# Patient Record
Sex: FEMALE | Race: WHITE
[De-identification: names, ages, dates, MRNs, and addresses within clinical notes are randomized per-mention and may not be internally consistent; named-entity substitution may affect disease eponyms.]

---

## 2018-02-20 ENCOUNTER — HOSPITAL ENCOUNTER (INPATIENT)
Dept: HOSPITAL 25 - PMRU | Age: 78
LOS: 28 days | Discharge: NURSING, REHAB OR LONG-TERM CARE | DRG: 57 | End: 2018-03-20
Attending: PHYSICAL MEDICINE & REHABILITATION | Admitting: PHYSICAL MEDICINE & REHABILITATION
Payer: MEDICARE

## 2018-02-20 DIAGNOSIS — I69.351: Primary | ICD-10-CM

## 2018-02-20 DIAGNOSIS — F17.210: ICD-10-CM

## 2018-02-20 DIAGNOSIS — K52.9: ICD-10-CM

## 2018-02-20 DIAGNOSIS — Z88.6: ICD-10-CM

## 2018-02-20 DIAGNOSIS — L89.612: ICD-10-CM

## 2018-02-20 DIAGNOSIS — Z79.899: ICD-10-CM

## 2018-02-20 DIAGNOSIS — R73.9: ICD-10-CM

## 2018-02-20 DIAGNOSIS — B96.1: ICD-10-CM

## 2018-02-20 DIAGNOSIS — E87.6: ICD-10-CM

## 2018-02-20 DIAGNOSIS — I10: ICD-10-CM

## 2018-02-20 DIAGNOSIS — Z88.0: ICD-10-CM

## 2018-02-20 DIAGNOSIS — I69.322: ICD-10-CM

## 2018-02-20 DIAGNOSIS — I69.319: ICD-10-CM

## 2018-02-20 DIAGNOSIS — A04.72: ICD-10-CM

## 2018-02-20 DIAGNOSIS — I69.391: ICD-10-CM

## 2018-02-20 DIAGNOSIS — E83.42: ICD-10-CM

## 2018-02-20 DIAGNOSIS — Z66: ICD-10-CM

## 2018-02-20 DIAGNOSIS — N39.0: ICD-10-CM

## 2018-02-20 DIAGNOSIS — I48.0: ICD-10-CM

## 2018-02-20 DIAGNOSIS — B96.89: ICD-10-CM

## 2018-02-20 DIAGNOSIS — Z88.2: ICD-10-CM

## 2018-02-20 DIAGNOSIS — R13.10: ICD-10-CM

## 2018-02-20 DIAGNOSIS — I69.392: ICD-10-CM

## 2018-02-20 PROCEDURE — F08Z1ZZ DRESSING TECHNIQUES TREATMENT: ICD-10-PCS | Performed by: PHYSICAL MEDICINE & REHABILITATION

## 2018-02-20 PROCEDURE — F07Z9ZZ GAIT TRAINING/FUNCTIONAL AMBULATION TREATMENT: ICD-10-PCS | Performed by: PHYSICAL MEDICINE & REHABILITATION

## 2018-02-20 PROCEDURE — 83735 ASSAY OF MAGNESIUM: CPT

## 2018-02-20 PROCEDURE — 81003 URINALYSIS AUTO W/O SCOPE: CPT

## 2018-02-20 PROCEDURE — F06ZDZZ SWALLOWING DYSFUNCTION TREATMENT: ICD-10-PCS | Performed by: PHYSICAL MEDICINE & REHABILITATION

## 2018-02-20 PROCEDURE — 80048 BASIC METABOLIC PNL TOTAL CA: CPT

## 2018-02-20 PROCEDURE — F08Z3ZZ FEEDING/EATING TREATMENT: ICD-10-PCS | Performed by: PHYSICAL MEDICINE & REHABILITATION

## 2018-02-20 PROCEDURE — 87086 URINE CULTURE/COLONY COUNT: CPT

## 2018-02-20 PROCEDURE — 80053 COMPREHEN METABOLIC PANEL: CPT

## 2018-02-20 PROCEDURE — F06Z6ZZ COMMUNICATIVE/COGNITIVE INTEGRATION SKILLS TREATMENT: ICD-10-PCS | Performed by: PHYSICAL MEDICINE & REHABILITATION

## 2018-02-20 PROCEDURE — 81015 MICROSCOPIC EXAM OF URINE: CPT

## 2018-02-20 PROCEDURE — 83036 HEMOGLOBIN GLYCOSYLATED A1C: CPT

## 2018-02-20 PROCEDURE — 74230 X-RAY XM SWLNG FUNCJ C+: CPT

## 2018-02-20 PROCEDURE — F07Z8ZZ TRANSFER TRAINING TREATMENT: ICD-10-PCS | Performed by: PHYSICAL MEDICINE & REHABILITATION

## 2018-02-20 PROCEDURE — 36415 COLL VENOUS BLD VENIPUNCTURE: CPT

## 2018-02-20 PROCEDURE — 87186 SC STD MICRODIL/AGAR DIL: CPT

## 2018-02-20 PROCEDURE — F08Z0ZZ BATHING/SHOWERING TECHNIQUES TREATMENT: ICD-10-PCS | Performed by: PHYSICAL MEDICINE & REHABILITATION

## 2018-02-20 PROCEDURE — 87077 CULTURE AEROBIC IDENTIFY: CPT

## 2018-02-20 PROCEDURE — F07Z5ZZ BED MOBILITY TREATMENT: ICD-10-PCS | Performed by: PHYSICAL MEDICINE & REHABILITATION

## 2018-02-20 PROCEDURE — 85025 COMPLETE CBC W/AUTO DIFF WBC: CPT

## 2018-02-20 PROCEDURE — 71046 X-RAY EXAM CHEST 2 VIEWS: CPT

## 2018-02-20 RX ADMIN — DABIGATRAN ETEXILATE MESYLATE SCH MG: 150 CAPSULE ORAL at 20:59

## 2018-02-20 RX ADMIN — VANCOMYCIN HYDROCHLORIDE SCH MG: 125 CAPSULE ORAL at 20:58

## 2018-02-20 RX ADMIN — VANCOMYCIN HYDROCHLORIDE SCH MG: 125 CAPSULE ORAL at 16:10

## 2018-02-20 RX ADMIN — DOCUSATE SODIUM SCH: 100 CAPSULE, LIQUID FILLED ORAL at 20:56

## 2018-02-20 NOTE — HP
HISTORY AND PHYSICAL:

 

DATE OF ADMISSION:  02/20/18

 

REASON FOR ADMISSION:  CVA with right hemiplegia.

 

HISTORY OF PRESENT ILLNESS:  Tigist Suarez is a 77-year-old female.  She has a 
history significant for hypertension.  She lives in her own home in [place].  
On Saturday, 02/10/18, the patient was in her own home.  She called and spoke 
to her daughter.  Her daughter then called on 02/11/18 the following day and 
was not able to reach her mother.  She went to her mother's house and found her 
mother on the floor.  She was on the floor for many hours.  Unfortunately, she 
was outside the window for TPA.  Her daughter called 911 and she was brought to 
[facility].  She had imaging consistent with total occlusion of the 
left internal carotid with left basal ganglia and peripheral left middle 
cerebral artery territory.  Surgery was consulted; but due to the 100% occlusion
, no intervention was recommended.  She had a dense right hemiplegia as well as 
a left gaze preference.  She also was noted to have left-sided facial droop and 
significant dysarthria and dysphagia.  She was on a purred diet with nectar 
thick liquids.  She was felt to have Physical Therapy, Occupational Therapy, 
and Speech Therapy needs. She is now being admitted for inpatient rehab so that 
she might return to independent living.  Also of note, the patient did have 
newly documented atrial fibrillation with rapid ventricular response on 02/12/
18.  Recommendations of the cardiologist was that she be anticoagulated 1 week 
after the stroke onset.  She was started on Pradaxa on 02/17/18.  She also had 
a beta-blocker added.  She also had diarrhea while at [facility].  
C. diff was positive on 02/17/18.  She was started on Flagyl and then switched 
to vancomycin.  The vancomycin should finish 02/28/18.

 

PAST MEDICAL HISTORY:  Significant for the aforementioned hypertension.  In 
addition, the patient may have had a TIA over the summer, which she did not 
tell anybody about until after she had her stroke.  She developed the acute 
onset left facial droop for several hours which passed on its own.

 

CURRENT MEDICATIONS:  Include:

1.  Pradaxa.

2.  Cozaar.

3.  Lipitor.

4.  Toprol-XL.

5.  Vancomycin for C. diff.

 

ALLERGIES:  To ASPIRIN, BENADRYL, PENICILLIN, and SULFA.

 

SOCIAL HISTORY:  She is a smoker, nondrinker.  She lives by herself in a 1-
story house with several steps to enter her.  Her daughter lives next door.

 

REVIEW OF SYSTEMS:  The patient reports no shortness of breath or chest pain.

 

                               PHYSICAL EXAMINATION

 

VITAL SIGNS:  The patient's temperature is 98.2, blood pressure is 158/92, 
pulse 58, respirations 16.

 

HEENT:  She has a left gaze preference.  With effort, she is able to look past 
the midline to the right, but typically looks towards the left.  She has a left 
facial droop.

 

LUNGS:  Sound clear to auscultation bilaterally.

 

HEART:  Regular.  S1, S2 audible.

 

ABDOMEN:  Soft and nontender.

 

EXTREMITIES:  Her right upper and lower extremity appeared to be flaccid. 
Peripheral pulses were intact.He right heel has a 3 x 3 cm Stage II (at least) 
pressure ulcer, with outer epidermal layer open

 

NEUROLOGIC:  The patient was awake, alert, oriented.  Muscle strength on the 
right, I did not detect any active movement in her right upper and lower 
extremity. Sensation was slightly diminished.  As mentioned, she has a gaze 
preference to the left.  She has a significant dysarthria.

 

FUNCTIONAL EXAM:  She transfers with mod assist of 2 people.

 

 ASSESSMENT:  Cerebrovascular accident with right hemiplegia.

 

PLAN:  Integrate her into a comprehensive and therapeutic rehab program with 
the following goals.

 

1.  Physical Therapy will work with the patient.  They are going to work on 
functional transfer training, ambulation training, and wheelchair mobilities.

2.  Occupational Therapy will see the patient and work on her activities of 
daily living including toileting and toilet transfers.

3.  Speech Therapy will see the patient.  They are going to work on her 
significant dysarthria as well as her swallowing difficulties.

4.  We are going to continue Pradaxa for her atrial fibrillation along with 
Toprol- XL.

5.  DVT prophylaxis is Pradaxa 150 mg twice a day.

6.  For her dysarthria, she remains on a pureed diet with nectar thick liquids.

7.  For secondary stroke prevention, we are going to continue Pradaxa as well 
as Lipitor.

8.  Smoking cessation will be encouraged.

9.  SSRIs including Prozac as indicated.  We are going to start her on Prozac 
10 mg in the next few days.

10.  Family training as appropriate.

11.   will be closely involved to make sure that any services 
and equipment that the patient requires are in place prior to discharge.

12.  Home with appropriate services.

 

ESTIMATED LENGTH OF STAY:  Four to five weeks.

 

[id number]/[facility] #: [id number]

HARJEET

## 2018-02-21 LAB
BASOPHILS # BLD AUTO: 0.1 10^3/UL (ref 0–0.2)
EOSINOPHIL # BLD AUTO: 0.1 10^3/UL (ref 0–0.6)
HCT VFR BLD AUTO: 40 % (ref 35–47)
HGB BLD-MCNC: 13.4 G/DL (ref 12–16)
LYMPHOCYTES # BLD AUTO: 2.2 10^3/UL (ref 1–4.8)
MCH RBC QN AUTO: 29 PG (ref 27–31)
MCHC RBC AUTO-ENTMCNC: 33 G/DL (ref 31–36)
MCV RBC AUTO: 86 FL (ref 80–97)
MONOCYTES # BLD AUTO: 1.5 10^3/UL (ref 0–0.8)
NEUTROPHILS # BLD AUTO: 8.9 10^3/UL (ref 1.5–7.7)
NRBC # BLD AUTO: 0 10^3/UL
NRBC BLD QL AUTO: 0
PLATELET # BLD AUTO: 331 10^3/UL (ref 150–450)
RBC # BLD AUTO: 4.68 10^6/UL (ref 4–5.4)
WBC # BLD AUTO: 12.9 10^3/UL (ref 3.5–10.8)

## 2018-02-21 RX ADMIN — DOCUSATE SODIUM SCH: 100 CAPSULE, LIQUID FILLED ORAL at 09:00

## 2018-02-21 RX ADMIN — VANCOMYCIN HYDROCHLORIDE SCH MG: 125 CAPSULE ORAL at 13:32

## 2018-02-21 RX ADMIN — DABIGATRAN ETEXILATE MESYLATE SCH MG: 150 CAPSULE ORAL at 21:16

## 2018-02-21 RX ADMIN — ATORVASTATIN CALCIUM SCH MG: 80 TABLET, FILM COATED ORAL at 16:57

## 2018-02-21 RX ADMIN — VANCOMYCIN HYDROCHLORIDE SCH MG: 125 CAPSULE ORAL at 08:51

## 2018-02-21 RX ADMIN — DABIGATRAN ETEXILATE MESYLATE SCH MG: 150 CAPSULE ORAL at 08:51

## 2018-02-21 RX ADMIN — VANCOMYCIN HYDROCHLORIDE SCH MG: 125 CAPSULE ORAL at 21:16

## 2018-02-21 RX ADMIN — Medication SCH CAP: at 08:51

## 2018-02-21 RX ADMIN — LOSARTAN POTASSIUM SCH MG: 25 TABLET, FILM COATED ORAL at 08:51

## 2018-02-21 RX ADMIN — METOPROLOL SUCCINATE SCH MG: 50 TABLET, EXTENDED RELEASE ORAL at 08:51

## 2018-02-21 RX ADMIN — VANCOMYCIN HYDROCHLORIDE SCH MG: 125 CAPSULE ORAL at 16:57

## 2018-02-21 RX ADMIN — DOCUSATE SODIUM SCH: 100 CAPSULE, LIQUID FILLED ORAL at 21:12

## 2018-02-21 NOTE — PN
Progress Note


Date of Service: 02/21/18


Note: 


NUNU PRYOR was visited. Therapy notes read and reviewed. She remains with 

flaccid right side. She is also dysarthric, but communicates enough to be 

understood. Remains on pureed textures, nectar thick liquids








Current Medications: 


 Active Medications











Generic Name Dose Route Start Last Admin





  Trade Name Freq  PRN Reason Stop Dose Admin


 


Acetaminophen  650 mg  02/20/18 13:28  





  Tylenol Tab*  PO   





  Q6H PRN   





  FEVER/PAIN   


 


Atorvastatin Calcium  80 mg  02/21/18 17:00  02/21/18 16:57





  Lipitor*  PO   80 mg





  1700 IGOR   Administration


 


Dabigatran  150 mg  02/20/18 21:00  02/21/18 08:51





  Pradaxa Cap(Nf)  PO   150 mg





  BID IGOR   Administration


 


Docusate Sodium  100 mg  02/20/18 21:00  02/21/18 09:00





  Colace Cap*  PO   Not Given





  BID IGOR   


 


Lactobacillus Rhamnosus  1 cap  02/21/18 09:00  02/21/18 08:51





  Culturelle*  PO   1 cap





  DAILY IGOR   Administration


 


Losartan Potassium  100 mg  02/21/18 09:00  02/21/18 08:51





  Cozaar Tab*  PO   100 mg





  DAILY IGOR   Administration


 


Magnesium Hydroxide  30 ml  02/20/18 13:28  





  Milk Of Magnesia Liq*  PO   





  Q6H PRN   





  CONSTIPATION   


 


Metoprolol Succinate  75 mg  02/21/18 09:00  02/21/18 08:51





  Toprol Xl Tab*  PO   75 mg





  DAILY IGOR   Administration


 


Senna  2 tab  02/20/18 13:28  





  Senokot Tab*  PO   





  BEDTIME PRN   





  CONSTIPATION   


 


Vancomycin HCl  125 mg  02/20/18 17:00  02/21/18 16:57





  Vancomycin Cap*  PO  02/28/18 23:55  125 mg





  QID IGOR   Administration











Vital Signs: 


 Vital Signs











Temp Pulse Resp BP Pulse Ox


 


 97.9 F   57   18   145/64   94 


 


 02/21/18 15:46  02/21/18 15:46  02/21/18 15:48  02/21/18 15:46  02/21/18 15:48











Lab Results: 


 Laboratory Results - last 24 hr











  02/21/18 02/21/18





  07:20 07:20


 


WBC  12.9 H 


 


RBC  4.68 


 


Hgb  13.4 


 


Hct  40 


 


MCV  86 


 


MCH  29 


 


MCHC  33 


 


RDW  15 


 


Plt Count  331 


 


MPV  9 


 


Neut % (Auto)  69.3 


 


Lymph % (Auto)  17.3 L 


 


Mono % (Auto)  11.7 H 


 


Eos % (Auto)  0.9 


 


Baso % (Auto)  0.8 


 


Absolute Neuts (auto)  8.9 H 


 


Absolute Lymphs (auto)  2.2 


 


Absolute Monos (auto)  1.5 H 


 


Absolute Eos (auto)  0.1 


 


Absolute Basos (auto)  0.1 


 


Absolute Nucleated RBC  0 


 


Nucleated RBC %  0 


 


Sodium   138


 


Potassium   3.5


 


Chloride   105


 


Carbon Dioxide   27


 


Anion Gap   6


 


BUN   23


 


Creatinine   0.59


 


Est GFR ( Amer)   126.5


 


Est GFR (Non-Af Amer)   98.3


 


BUN/Creatinine Ratio   39.0 H


 


Glucose   101 H


 


Calcium   8.8


 


Total Bilirubin   0.60


 


AST   23


 


ALT   27


 


Alkaline Phosphatase   84


 


Total Protein   6.1 L


 


Albumin   3.1 L


 


Globulin   3.0


 


Albumin/Globulin Ratio   1.0











Exam: 





HEENT: L facial droop. Left gaze preference


LUNGS: Clear bilaterally


HEART: Reg rhythm


ABDOMEN: Soft + BS


NEUROLOGIC: Right side is flaccid. Dysarthric. 


Assessment/Plan: 





02/21/18 18:03


1. Left CVA, right hemiplegia: PT/OT/SLP. Pradaxa/Lipitor. May start Prozac


2. Dysphagia: Pureed Texture, nectar thick liquids


3. Right heel ulcer: Doing mepiplex dressings


4. P. Atrial Fibrillation: Pradaxa/Lopressor


5. C. Diff: Oral Vanco until 2/28


6. Advanced Directives: Has MOLST; DNR

## 2018-02-22 RX ADMIN — VANCOMYCIN HYDROCHLORIDE SCH MG: 125 CAPSULE ORAL at 12:51

## 2018-02-22 RX ADMIN — DABIGATRAN ETEXILATE MESYLATE SCH MG: 150 CAPSULE ORAL at 21:04

## 2018-02-22 RX ADMIN — VANCOMYCIN HYDROCHLORIDE SCH MG: 125 CAPSULE ORAL at 17:22

## 2018-02-22 RX ADMIN — VANCOMYCIN HYDROCHLORIDE SCH MG: 125 CAPSULE ORAL at 09:51

## 2018-02-22 RX ADMIN — ATORVASTATIN CALCIUM SCH MG: 80 TABLET, FILM COATED ORAL at 17:22

## 2018-02-22 RX ADMIN — METOPROLOL SUCCINATE SCH MG: 50 TABLET, EXTENDED RELEASE ORAL at 09:52

## 2018-02-22 RX ADMIN — DABIGATRAN ETEXILATE MESYLATE SCH MG: 150 CAPSULE ORAL at 09:51

## 2018-02-22 RX ADMIN — LOSARTAN POTASSIUM SCH MG: 25 TABLET, FILM COATED ORAL at 09:52

## 2018-02-22 RX ADMIN — Medication SCH CAP: at 09:52

## 2018-02-22 RX ADMIN — VANCOMYCIN HYDROCHLORIDE SCH MG: 125 CAPSULE ORAL at 21:04

## 2018-02-22 RX ADMIN — DOCUSATE SODIUM SCH: 100 CAPSULE, LIQUID FILLED ORAL at 09:11

## 2018-02-22 RX ADMIN — Medication SCH CAP: at 21:04

## 2018-02-22 RX ADMIN — NYSTATIN SCH APPLIC: 100000 POWDER TOPICAL at 22:42

## 2018-02-22 RX ADMIN — DOCUSATE SODIUM SCH: 100 CAPSULE, LIQUID FILLED ORAL at 20:53

## 2018-02-22 NOTE — PN
Progress Note


Date of Service: 02/22/18


Note: 


NUNU PRYOR was visited. Therapy notes read and reviewed. Will start Prozac 

tomorrow. We will also try the Free Water program as recommended by SLP: Allow 

sips of free water between meals after checking for any residual pocketed food. 

She has better eye movement today








Current Medications: 


 Active Medications











Generic Name Dose Route Start Last Admin





  Trade Name Freq  PRN Reason Stop Dose Admin


 


Acetaminophen  650 mg  02/20/18 13:28  





  Tylenol Tab*  PO   





  Q6H PRN   





  FEVER/PAIN   


 


Atorvastatin Calcium  80 mg  02/21/18 17:00  02/22/18 17:22





  Lipitor*  PO   80 mg





  1700 IGOR   Administration


 


Dabigatran  150 mg  02/20/18 21:00  02/22/18 09:51





  Pradaxa Cap(Nf)  PO   150 mg





  BID IGOR   Administration


 


Docusate Sodium  100 mg  02/20/18 21:00  02/22/18 09:11





  Colace Cap*  PO   Not Given





  BID IGOR   


 


Fluoxetine HCl  10 mg  02/23/18 09:00  





  Prozac Cap*  PO   





  DAILY IGOR   


 


Lactobacillus Rhamnosus  1 cap  02/22/18 21:00  





  Culturelle*  PO   





  BID IGOR   


 


Losartan Potassium  100 mg  02/21/18 09:00  02/22/18 09:52





  Cozaar Tab*  PO   100 mg





  DAILY IGOR   Administration


 


Magnesium Hydroxide  30 ml  02/20/18 13:28  





  Milk Of Magnesia Liq*  PO   





  Q6H PRN   





  CONSTIPATION   


 


Metoprolol Succinate  75 mg  02/21/18 09:00  02/22/18 09:52





  Toprol Xl Tab*  PO   75 mg





  DAILY IGOR   Administration


 


Nystatin  1 applic  02/22/18 21:00  





  Nystatin Top Powder*  TOPICAL   





  BID IGOR   


 


Senna  2 tab  02/20/18 13:28  





  Senokot Tab*  PO   





  BEDTIME PRN   





  CONSTIPATION   


 


Vancomycin HCl  125 mg  02/20/18 17:00  02/22/18 17:22





  Vancomycin Cap*  PO  02/28/18 23:55  125 mg





  QID IGOR   Administration











Vital Signs: 


 Vital Signs











Temp Pulse Resp BP Pulse Ox


 


 97.9 F   57   18   180/89   98 


 


 02/22/18 15:20  02/22/18 15:20  02/22/18 15:20  02/22/18 15:20  02/22/18 19:22











Exam: 





HEENT: L facial droop. Left gaze preference


LUNGS: Clear bilaterally


HEART: Reg rhythm


ABDOMEN: Soft + BS


NEUROLOGIC: Right side is flaccid. Dysarthric. 


Assessment/Plan: 





1. Left CVA, right hemiplegia: PT/OT/SLP. Pradaxa/Lipitor. Will start Prozac 2/ 23


2. Dysphagia: Pureed Texture, nectar thick liquids


3. Right heel ulcer: Doing mepiplex dressings


4. P. Atrial Fibrillation: Pradaxa/Lopressor


5. C. Diff: Oral Vanco until 2/28, increase Culturelle to BID


6. Advanced Directives: Has MOLST; DNR


02/22/18 20:12

## 2018-02-23 RX ADMIN — DABIGATRAN ETEXILATE MESYLATE SCH MG: 150 CAPSULE ORAL at 09:07

## 2018-02-23 RX ADMIN — VANCOMYCIN HYDROCHLORIDE SCH MG: 125 CAPSULE ORAL at 09:07

## 2018-02-23 RX ADMIN — Medication SCH CAP: at 20:54

## 2018-02-23 RX ADMIN — DABIGATRAN ETEXILATE MESYLATE SCH MG: 150 CAPSULE ORAL at 20:47

## 2018-02-23 RX ADMIN — ATORVASTATIN CALCIUM SCH MG: 80 TABLET, FILM COATED ORAL at 17:26

## 2018-02-23 RX ADMIN — VANCOMYCIN HYDROCHLORIDE SCH MG: 125 CAPSULE ORAL at 20:54

## 2018-02-23 RX ADMIN — NYSTATIN SCH APPLIC: 100000 POWDER TOPICAL at 20:54

## 2018-02-23 RX ADMIN — NYSTATIN SCH APPLIC: 100000 POWDER TOPICAL at 09:08

## 2018-02-23 RX ADMIN — FLUOXETINE SCH MG: 10 CAPSULE ORAL at 09:07

## 2018-02-23 RX ADMIN — VANCOMYCIN HYDROCHLORIDE SCH MG: 125 CAPSULE ORAL at 12:59

## 2018-02-23 RX ADMIN — VANCOMYCIN HYDROCHLORIDE SCH MG: 125 CAPSULE ORAL at 17:26

## 2018-02-23 RX ADMIN — DOCUSATE SODIUM SCH: 100 CAPSULE, LIQUID FILLED ORAL at 20:54

## 2018-02-23 RX ADMIN — Medication SCH CAP: at 09:07

## 2018-02-23 RX ADMIN — METOPROLOL SUCCINATE SCH MG: 50 TABLET, EXTENDED RELEASE ORAL at 09:07

## 2018-02-23 RX ADMIN — DOCUSATE SODIUM SCH: 100 CAPSULE, LIQUID FILLED ORAL at 09:08

## 2018-02-23 RX ADMIN — LOSARTAN POTASSIUM SCH MG: 25 TABLET, FILM COATED ORAL at 09:07

## 2018-02-23 NOTE — PN
Progress Note


Date of Service: 02/23/18


Note: 


NUNU PRYOR was visited. Nursing and therapy notes read and reviewed. No 

chest pain, shortness of breath or abdominal pain.  She does not offer any 

complaints.  She is aware she had a big stroke.








Current Medications: 


 Active Medications











Generic Name Dose Route Start Last Admin





  Trade Name Freq  PRN Reason Stop Dose Admin


 


Acetaminophen  650 mg  02/20/18 13:28  





  Tylenol Tab*  PO   





  Q6H PRN   





  FEVER/PAIN   


 


Atorvastatin Calcium  80 mg  02/21/18 17:00  02/22/18 17:22





  Lipitor*  PO   80 mg





  1700 IGOR   Administration


 


Dabigatran  150 mg  02/20/18 21:00  02/23/18 09:07





  Pradaxa Cap(Nf)  PO   150 mg





  BID IGOR   Administration


 


Docusate Sodium  100 mg  02/20/18 21:00  02/23/18 09:08





  Colace Cap*  PO   Not Given





  BID IGOR   


 


Fluoxetine HCl  10 mg  02/23/18 09:00  02/23/18 09:07





  Prozac Cap*  PO   10 mg





  DAILY IGOR   Administration


 


Lactobacillus Rhamnosus  1 cap  02/22/18 21:00  02/23/18 09:07





  Culturelle*  PO   1 cap





  BID IGOR   Administration


 


Losartan Potassium  100 mg  02/21/18 09:00  02/23/18 09:07





  Cozaar Tab*  PO   100 mg





  DAILY IGOR   Administration


 


Magnesium Hydroxide  30 ml  02/20/18 13:28  





  Milk Of Magnesia Liq*  PO   





  Q6H PRN   





  CONSTIPATION   


 


Metoprolol Succinate  75 mg  02/21/18 09:00  02/23/18 09:07





  Toprol Xl Tab*  PO   75 mg





  DAILY IGOR   Administration


 


Nystatin  1 applic  02/22/18 21:00  02/23/18 09:08





  Nystatin Top Powder*  TOPICAL   1 applic





  BID IGOR   Administration


 


Senna  2 tab  02/20/18 13:28  





  Senokot Tab*  PO   





  BEDTIME PRN   





  CONSTIPATION   


 


Vancomycin HCl  125 mg  02/20/18 17:00  02/23/18 09:07





  Vancomycin Cap*  PO  02/28/18 23:55  125 mg





  QID IGOR   Administration











Vital Signs: 


 Vital Signs











Temp Pulse Resp BP Pulse Ox


 


 98.5 F   68   18   180/72   93 


 


 02/23/18 06:10  02/23/18 06:10  02/22/18 23:50  02/23/18 06:10  02/23/18 06:10








Repeated manually by nurse at 1030 a /72 and HR 74 while sitting up in 

the chair.





Lab Results: 





Noted WBCs 12.9 on 2/21/18.





Exam: 





GEN: No acute distress. Alert and appropriate.


HEENT: L facial droop. Left gaze preference.


LUNGS: Clear bilaterally


HEART: Regular rate and rhythm


ABDOMEN: Soft, + BS, non-tender, non-distended


NEUROLOGIC: Right side is flaccid. Dysarthric. She says she can feel LT on 

right side, but does not seem reliable.


EXTREMITIES: No edema. Right lower leg is ACE wrapped with spenco on.


Assessment/Plan: 


78yo woman s/p CVA with right hemiplegia, dysphagia, dysarthria, and cognitive 

impairment.





1. Left CVA, right hemiplegia: PT/OT/SLP. Pradaxa/Lipitor. Prozac started today.


2. Dysphagia: Pureed Texture, nectar thick liquids


3. Right heel ulcer: Doing mepilex dressings.  Consider multipodus boot with 

vigilant skin checks.


4. P. Atrial Fibrillation: Pradaxa/Lopressor


5. C. Diff: Oral Vanco until 2/28, increased Culturelle to BID


6. Hypertension: It is better once she is sitting up.  I do not want to add 

another agent and make her hypotensive.  Will have HOB elevated to 30deg when 

in bed and if not sleeping, have vitals taken with her sitting up.


7. Mild leukocytosis on 2/22/18: recheck cbc in am.


8. Advanced Directives: Has MOLST; DNR


9. Estimated LOS: IP meeting today.





02/23/18 10:36

## 2018-02-23 NOTE — PMRUTEAM
PMRU: Goals


Current Status: 


 Nursing: Current Status











Skin Deviations [Right Lower   Abrasion





Leg]                           


 


Skin Deviations [Buttocks]     Rash


 


Skin Deviations [Right Heel]   Wound


 


Skin Deviations [Left Heel]    Other


 


Skin Deviation Description [   abrasion x2, scabbed over





Right Lower Leg]               


 


Skin Deviation Description [   redness. lotion applied





Buttocks]                      


 


Skin Deviation Description [   spanko boots in place





Right Heel]                    


 


Skin Deviation Description [   spanko boots in place





Left Heel]                     


 


Wound Stage [Right Heel]       II











 Physical Therapy: Current Status











Bed Mobility Assistance        Not Tested


 


Transfer Moblility Assistance  2 Max Assist


 


Transfer/Bed Mobility          None





Recommended Devices            


 


Ambulation Assistance          Unable


 


Ambulation Assistive Devices   Joey Walker














 Occupational Therapy: Current Status











Upper Body Dressing            Max Asst


 


Lower Body Dressing            Total Assist


 


Bathing                        Max Asst


 


Toileting                      Total Assist


 


Toilet Transfer                Max Asst,2 Person Assist


 


Eating                         Supervision,Min Assist














 Rec Therapy: Current Status











Summary of Assessment and      Pt. was open to conversation and identified with





Clinical Impression            interests.  Pt. states she enjoys her life and is





 "very independent".  Pt. had poor eye contact in





 conversation and at times had a difficult





 expressing herself and stated "I feel like my mind





 goes blank".  Pt. was interested in word puzzles





 which were provided to her.  Pt. was open to





 continued leisure visits.


 


Treatment Goals                Pt. will engage in recreation and leisure





 activities while on the unit.


 


Treatment Plan                 Provide RT services and encourage involvement.














 Social Work: Current Status











Discharge Plan                 return home with home care svs and family support


 


Potential for Family Training  pt's daughter is attentive and involved


 


Anticipated Discharge          Home





Destination                    


 


Discharge With                 home care svs and family support














 Speech: Current Status











Assessment                     Swallowing:  Patient demonstrated moderate





 orophayrngeal swallowing impairment ad requires





 restriction to pureed consistency, nectar thick





 liquids, and pills in pudding consistency.





 Pt is not accepting of adequate intake of nectar





 liquids, and is appropriate for free water





 protocol with use of precautions.














Goals: 


 Physical Therapy: Initial Goals











Bed Mobility Assistance        Independent


 


Transfer Mobility Assistance   Independent


 


Transfer/Bed Mobility          Joey Walker





Recommended Devices            


 


Ambulation                     Independent


 


Ambulation Recommended Devices Joey Walker


 


Ambulation Distance            50


 


Stairs Assistance              Independent


 


Stair Recommended Devices      One Rail


 


Number of Stairs               5














 Occupational Therapy: Initial Goals











Goals to be Completed in (Days 4-6 weeks





)                              


 


Upper Body Bathing Routine     Modified Independent with


 


Lower Body Bathing Routine     Modified Independent with


 


Upper Body Dressing Routine    Modified Independent with


 


Lower Body Dressing Routine    Modified Independent with


 


Toilet Hygeine and Clothing    Modified Independent with





Management Routine             


 


Toilet Transfer Routine        Modified Independent with


 


Step-In Shower Transfer        Modified Independent with





Routine                        


 


Tub Transfer Routine           Modified Independent with


 


Functional Transfers for ADL   Modified Independent with


 


Grooming Routine               Modified Independent with


 


Feeding Routine                Modified Independent with














 Nutrition: Goals











Intervention Goals             1.  pt will tolerate least-restrictive diet





 texture and liquid consistency without s/sx





 aspiration





 2.  adequate po intake to maintain lean body mass





 and hydration; no clinical s/sx dehydration





 3.  bowel pattern will be regulated; no c/o





 diarrhea (or constipation)











 Speech: Goals











Speech Goal 1                  Swallowing


 


Goal 1 Comments                Long-Term Goal: Pt will tolerate least 

restrictive





 diet consistencies with no clinical s/s or





 complications from aspiration.





 Short-Term Goal: Pt will will tolerate skilled





 trials of thin water with minimal clinical s/s





 aspiration and demonstrate understanding and





 ability to follow precautions to tolerate free





 water protocol without complications from





 aspiration.





 Status: Met.





 Revised Short-Term Goal: Pt will will tolerate





 thin water independently with minimal clinical s/s





 aspiration and demonstrate understanding and





 ability to follow precautions to tolerate free





 water protocol without complications from





 aspiration.





 SLP instructed pt to follow compensatory





 strategies of sitting upright, completing oral





 care before drinking thin water, tucking chin to





 the Left side, and taking small single sips. When





 pt did not follow precautios, pt demonstrated wet





 coughing and awareness of her mistake. SLP faded





 cueing from moderate souleymane cueing, and pt continued





 to follow precautions independently. Pt recalled





 basic precautions and required moderate cueing to





 recall all precautions.





 Nursing reported pt pocketed pills and did not





 clear pills with nectar liquids rinse, but did





 clear pills with pudding. SLP provided printed





 instructiosn and written signage for patient and





 care givers, and made recommendations to provider.





 


 


Speech Goal 2                  Problem Solving


 


Speech Goal 2 Comments         Long-Term Goal: Pt will solve functional daily





 problems independently using compensatory





 strategies as needed.





 Short-term goal:  Pt will attend to and recall





 orientation and her right-sided deficits, and





 demonstrate use of compensatory strategies to





 solve simple memory, feeding and self-care





 problems.





 Status:  Ongoing.





 SLP provided printed and written verbal puzzles.





 Pt required hand-over-hand assistance to initiate





 writing with her unaffected nondominant Left hand,





 and advanced from graded activities from simple





 strokes to letter forms woth cueing faded to





 moderate. Pt demonstated letter-finding difficulty





 in writing and verbally. For expressive cross-





 word and receptive word-search puzzle tasks, pt





 required maximal cueing to use gaze-Right strategy





 with bright visual marker, to attend to





 information on the Right side of pages.





 














 Social Work: Goals











Discharge Plan                 return home with home care svs and family support


 


Potential for Family Training  pt's daughter is attentive and involved


 


Anticipated Discharge          Home





Destination                    


 


Discharge With                 home care svs and family support

















Care Plan: 


 Care Plan





ADL's - Improve/Maintain                Start:  02/20/18 15:29              


Freq:   QSHIFT                          Status: Active      Target:         


Protocol:                                                                   








Activity Type Activity Date Activity User E-Sign Co-Sign Detail





Recorded Client Recorded Date Recorded By   


 


Document 02/23/18 11:25 [id number]   





PMRU-C09 02/23/18 11:25 [id number]   














  02/23/18





  11:25


 


PMRU Outcome: ADL's/ADL Transfers 


 


Orders/Interventions Occupational





 Therapy





 Evaluation &





 Treatment





 Communication





 Tool in Patient





 Room


 


Address Deficits Secondary To: CVA


 


Patient to receive OT 5x/wk for  Therex





min/day Self Care





 Management





 Group Therapy





 Neuromuscular





 ReEducation


 


UE/LE ADL's with Assist Yes: Iraj


 


ADL Transfers with Assist Yes: Iraj


 


Toileting: Transfers,Clothing Management Yes: Iraj





,Hygeine w/Assist 


 


Light Kitchen/Laundry w/Assist Yes: Bernarda


 


Progression Toward Outcome/Goals Progressing


 


Outcome/Goals Met Pt participated





 well in OT





 treatment





 session, pt





 with difficulty





 recalling





 previous





 sessions with





 minimal recall





 from session to





 session





 regarding





 hemidressing





 and other





 techniques. Pt





 will benefit





 from continued





 skilled OT





 intervention to





 maximize





 independence





 and safety.








Communication-Improve/Maintain          Start:  02/22/18 02:06              


Freq:   QSHIFT                          Status: Active      Target:         


Protocol:                                                                   








Activity Type Activity Date Activity User E-Sign Co-Sign Detail





Recorded Client Recorded Date Recorded By   


 


Document 02/23/18 11:58 [id number]   





SPEECH-C04 02/23/18 12:00 [id number]   














  02/23/18





  11:58


 


PMRU Outcome: Communication/Cognitive 





Status 


 


Outcome/Goals Use Comm Tools/





 Devices





 Makes Needs





 Known





 Effectively


 


Other Outcomes/Goals Long-Term Goal:





 Pt will





 tolerate least





 restrictive





 diet





 consistencies





 with no





 clinical s/s or





 complications





 from aspiration





 .





 Long-Term Goal:





 Pt will solve





 functional





 daily problems





 independently





 using





 compensatory





 strategies as





 needed.





 Short-term goal





 :  Pt will





 attend to and





 recall





 orientation and





 her right-





 sided deficits,





 and





 demonstrate use





 of





 compensatory





 strategies to





 solve simple





 memory, feeding





 and self-care





 problems.





 Short-Term Goal





 : Pt will will





 tolerate pureed





 consistency





 and nectar





 thick liquids w





 /o clinical s/s





 or





 complications





 from aspiration





 Short-Term Goal





 : Pt will will





 tolerate thin





 water





 independently





 with minimal





 clinical s/s





 aspiration and





 demonstrate





 understanding





 and ability to





 follow





 precautions to





 tolerate free





 water protocol





 without





 complications





 from aspiration





 .


 


Progression Toward Outcomes/Goals Goals Adjusted








Coping/Psych-Improve/Maintain           Start:  02/22/18 02:06              


Freq:   QSHIFT                          Status: Active      Target:         


Protocol:                                                                   








Activity Type Activity Date Activity User E-Sign Co-Sign Detail





Recorded Client Recorded Date Recorded By   


 


Document 02/23/18 00:53 [id number]   





PMRU-C03 02/23/18 00:55 [id number]   














  02/23/18





  00:53


 


PMRU Outcome: Coping/Psychosocial 


 


Coping Outcome/Goals Verbalization





 of Acceptance





 of Rehab Admit





 Willingness to





 Participate in





 Treatment Plan





 and Basic Needs


 


Psychosocial Outcome/Goals Maintain/





 Improve





 Emotional





 Health





 Cooperate/





 Participate in





 Plan


 


Progression Toward Outcome/Goals - Progressing





Coping 


 


Progression Toward Outcome/Goals - Progressing





Psychosocial 








Discharge Planning - Improve/Maintain   Start:  02/22/18 02:06              


Freq:   QSHIFT                          Status: Active      Target:         


Protocol:                                                                   








Activity Type Activity Date Activity User E-Sign Co-Sign Detail





Recorded Client Recorded Date Recorded By   


 


Document 02/23/18 00:52 [id number]   





PMRU-C03 02/23/18 00:53 [id number]   














  02/23/18





  00:52


 


PMRU Outcome: Discharge Planning 


 


Update Patient Family No


 


Outcome/Goals Demonstrates





 Understanding





 of Discharge





 Plan


 


Progression Toward Outcome/Goals Progressing








Education-Improve/Maintain              Start:  02/22/18 02:06              


Freq:   QSHIFT                          Status: Active      Target:         


Protocol:                                                                   








Activity Type Activity Date Activity User E-Sign Co-Sign Detail





Recorded Client Recorded Date Recorded By   


 


Document 02/23/18 00:53 [id number]   





PMRU-C03 02/23/18 00:55 [id number]   














  02/23/18





  00:53


 


PMRU Outcome: Education 


 


Outcome/Goals Demonstrate/





 Verbalize





 Understanding





 of Written





 Discharge





 Instructions





 Demonstrates





 Skills





 Encourage





 Questions


 


Progression Toward Outcome/Goals Progressing








/GI-Improve/Maintain                  Start:  02/22/18 02:06              


Freq:   QSHIFT                          Status: Active      Target:         


Protocol:                                                                   








Activity Type Activity Date Activity User E-Sign Co-Sign Detail





Recorded Client Recorded Date Recorded By   


 


Document 02/23/18 00:53 [id number]   





PMRU-C03 02/23/18 00:55 [id number]   














  02/23/18





  00:53


 


PMRU Outcome: Genitourinary/ 





Gastrointestinal 


 


Genitourinary- Outcome/Goals Maintain/





 Achieve Urinary





 Continence





 Remain Free of





 Hospital-





 Acquired UTI


 


Gastrointestinal-Outcome/Goals Maintain/





 Achieve Bowel





 Regularity in





 Accordance with





 Pt's Baseline





 Prevent





 Constipation


 


Progression Toward Outcome/Goals -  Progressing


 


Progression Toward Outcome/Goals - GI Not Progressing


 


Outcome/Goals Met Comment + for C,diff -





 pt still has





 bouts of





 diarrhea








Medication Administration               Start:  02/22/18 02:06              


Freq:   QSHIFT                          Status: Active      Target:         


Protocol:                                                                   








Activity Type Activity Date Activity User E-Sign Co-Sign Detail





Recorded Client Recorded Date Recorded By   


 


Document 02/23/18 00:53 [id number]   





PMRU-C03 02/23/18 00:55 [id number]   














  02/23/18





  00:53


 


PMRU Outcome: Medication Administration 


 


Assess Patient Knowledge/Teach Med No





Education for all Meds 


 


Outcome/Goals Patient





 Independent





 with Medication





 Administration





 at Home





 Demonstrates





 Understanding


 


Progression Towards Outcome/Goals Progressing


 


Is Patient Going Home on Lovenox? No








Mobility- Improve/Maintain              Start:  02/20/18 18:22              


Freq:   QSHIFT                          Status: Active      Target:         


Protocol:                                                                   








Activity Type Activity Date Activity User E-Sign Co-Sign Detail





Recorded Client Recorded Date Recorded By   


 


Document 02/22/18 12:20 [id number]   





PMRU-C08 02/22/18 12:20 [id number]   














  02/22/18





  12:20


 


PMRU Outcome: Mobility 


 


Physical Therapy Evaluation and Yes





Treatment 


 


Activity OOB with Assistance Yes


 


Device Yes


 


Assistance Yes


 


Patient to be seen 5x/wk for  min/ Therex





day for: Mobility





 Training





 Gait Training





 W/C Mobility





 Balance


 


Outcome/Goals Maintain/





 Achieve





 Baseline





 Mobility Status





 Improve





 Mobility Status





 Demonstrates





 Proper Use of





 Assistive





 Devices





 Free from





 Complications





 of Immobility


 


Progression Toward Outcome/Goals Progressing


 


Bed Mobility Yes:





 independent


 


Transfers Yes:





 independnet





 with joey





 walker


 


Gait x ft Yes:





 independent





 with joey





 walker to





 household





 distances (50')


 


Up/Down Stairs Yes:





 independent





 with 1 rail up/





 down 5








Neurological- Improve/Maintain          Start:  02/22/18 02:06              


Freq:   QSHIFT                          Status: Active      Target:         


Protocol:                                                                   








Activity Type Activity Date Activity User E-Sign Co-Sign Detail





Recorded Client Recorded Date Recorded By   


 


Document 02/23/18 00:53 [id number]   





PMRU-C03 02/23/18 00:55 [id number]   














  02/23/18





  00:53


 


PMRU Outcome: Neurological 


 


Weakness/Aphasia Weakness





 Right Side


 


Outcome/Goals Maintain/





 Achieve





 Baseline





 Neurological





 Status





 Improve





 Neurological





 Status





 Prevent





 Avoidable





 Neurological





 Decline





 Demonstrate





 Knowledge of





 Prevention/Tx





 of Neuro





 Disorders/





 Complication





 Maintain/





 Improve





 Strength/ROM


 


Progression Toward Outcome/Goals Progressing








Nutrition/Swallowing- Improve/Maintain  Start:  02/22/18 02:06              


Freq:   QSHIFT                          Status: Active      Target:         


Protocol:                                                                   








Activity Type Activity Date Activity User E-Sign Co-Sign Detail





Recorded Client Recorded Date Recorded By   


 


Document 02/23/18 00:53 [id number]   





PMRU-C03 02/23/18 00:55 [id number]   














  02/23/18





  00:53


 


PMRU Outcome: Nutrition/Swallowing 


 


Outcome/Goals Demonstrates





 Adequate





 Hydration/





 Prevents





 Dehydration


 


Progression Toward Outcome/Goals Progressing








Safety- Improve/Maintain                Start:  02/22/18 02:06              


Freq:   QSHIFT                          Status: Active      Target:         


Protocol:                                                                   








Activity Type Activity Date Activity User E-Sign Co-Sign Detail





Recorded Client Recorded Date Recorded By   


 


Document 02/23/18 00:53 [id number]   





PMRU-C03 02/23/18 00:55 [id number]   














  02/23/18





  00:53


 


PMRU Outcome: Safety 


 


Outcome/Goals Remain Free of





 Injury or Harm





 Cooperates with





 Safety





 Measures for





 Least





 Restrictive





 Environment





 Prevent Falls/





 Injury


 


Progression Toward Outcome/Goals Progressing


 


Outcome/Goals Met Comment PA in place








Skin- Improve/Maintain                  Start:  02/22/18 02:06              


Freq:   QSHIFT                          Status: Active      Target:         


Protocol:                                                                   








Activity Type Activity Date Activity User E-Sign Co-Sign Detail





Recorded Client Recorded Date Recorded By   


 


Document 02/23/18 00:53 [id number]   





PMRU-C03 02/23/18 00:55 [id number]   














  02/23/18





  00:53


 


PMRU Outcome: Skin 


 


Skin Risk Level High


 


Skin Orders Spenco Boots





 Heels Off Bed





 Turn/Position





 q2hr While in





 Bed


 


Outcome/Goals Maintain/





 Improve Skin





 Intergrity





 Maintain/





 Improve Wound





 Status


 


Progression Toward Outcome/Goals Progressing


 


Outcome/Goals Met Comment heels in spanko





 boots














Medicine Note: 








Length of Stay:  [6 weeks]





Anticipated Discharge Destination: Home





Tentative Discharge Date:  [4/6/18]





Discharged to:  [home]

## 2018-02-24 LAB
BASOPHILS # BLD AUTO: 0.1 10^3/UL (ref 0–0.2)
EOSINOPHIL # BLD AUTO: 0.1 10^3/UL (ref 0–0.6)
HCT VFR BLD AUTO: 42 % (ref 35–47)
HGB BLD-MCNC: 14.2 G/DL (ref 12–16)
LYMPHOCYTES # BLD AUTO: 2.7 10^3/UL (ref 1–4.8)
MCH RBC QN AUTO: 29 PG (ref 27–31)
MCHC RBC AUTO-ENTMCNC: 34 G/DL (ref 31–36)
MCV RBC AUTO: 86 FL (ref 80–97)
MONOCYTES # BLD AUTO: 1.4 10^3/UL (ref 0–0.8)
NEUTROPHILS # BLD AUTO: 9.3 10^3/UL (ref 1.5–7.7)
NRBC # BLD AUTO: 0 10^3/UL
NRBC BLD QL AUTO: 0
PLATELET # BLD AUTO: 331 10^3/UL (ref 150–450)
RBC # BLD AUTO: 4.86 10^6/UL (ref 4–5.4)
WBC # BLD AUTO: 13.6 10^3/UL (ref 3.5–10.8)

## 2018-02-24 RX ADMIN — VANCOMYCIN HYDROCHLORIDE SCH MG: 125 CAPSULE ORAL at 12:53

## 2018-02-24 RX ADMIN — DABIGATRAN ETEXILATE MESYLATE SCH MG: 150 CAPSULE ORAL at 08:57

## 2018-02-24 RX ADMIN — VANCOMYCIN HYDROCHLORIDE SCH MG: 125 CAPSULE ORAL at 21:02

## 2018-02-24 RX ADMIN — Medication SCH CAP: at 21:02

## 2018-02-24 RX ADMIN — FLUOXETINE SCH MG: 10 CAPSULE ORAL at 08:57

## 2018-02-24 RX ADMIN — HYDRALAZINE HYDROCHLORIDE PRN MG: 10 TABLET ORAL at 16:33

## 2018-02-24 RX ADMIN — DABIGATRAN ETEXILATE MESYLATE SCH MG: 150 CAPSULE ORAL at 21:02

## 2018-02-24 RX ADMIN — CIPROFLOXACIN HYDROCHLORIDE SCH MG: 250 TABLET, FILM COATED ORAL at 21:06

## 2018-02-24 RX ADMIN — AMLODIPINE BESYLATE SCH MG: 5 TABLET ORAL at 21:02

## 2018-02-24 RX ADMIN — LOSARTAN POTASSIUM SCH MG: 25 TABLET, FILM COATED ORAL at 08:57

## 2018-02-24 RX ADMIN — NYSTATIN SCH APPLIC: 100000 POWDER TOPICAL at 09:02

## 2018-02-24 RX ADMIN — ATORVASTATIN CALCIUM SCH MG: 80 TABLET, FILM COATED ORAL at 16:33

## 2018-02-24 RX ADMIN — DOCUSATE SODIUM SCH: 100 CAPSULE, LIQUID FILLED ORAL at 08:53

## 2018-02-24 RX ADMIN — VANCOMYCIN HYDROCHLORIDE SCH MG: 125 CAPSULE ORAL at 08:57

## 2018-02-24 RX ADMIN — METOPROLOL SUCCINATE SCH MG: 50 TABLET, EXTENDED RELEASE ORAL at 08:56

## 2018-02-24 RX ADMIN — NYSTATIN SCH APPLIC: 100000 POWDER TOPICAL at 21:20

## 2018-02-24 RX ADMIN — VANCOMYCIN HYDROCHLORIDE SCH MG: 125 CAPSULE ORAL at 16:33

## 2018-02-24 RX ADMIN — Medication SCH CAP: at 08:57

## 2018-02-24 NOTE — PN
Progress Note


Date of Service: 02/24/18


Note: 


NUNU PRYOR was visited. Nursing and therapy notes read and reviewed. She 

wants to get dressed and into the chair as she expects visitors today.  No 

chest pain, shortness of breath or abdominal pain. She asks why is she on a 

pureed diet.  Stools are loose and she is incontinent for stool and urine.








Current Medications: 


 Active Medications











Generic Name Dose Route Start Last Admin





  Trade Name Freq  PRN Reason Stop Dose Admin


 


Acetaminophen  650 mg  02/20/18 13:28  





  Tylenol Tab*  PO   





  Q6H PRN   





  FEVER/PAIN   


 


Atorvastatin Calcium  80 mg  02/21/18 17:00  02/23/18 17:26





  Lipitor*  PO   80 mg





  1700 IGOR   Administration


 


Dabigatran  150 mg  02/20/18 21:00  02/24/18 08:57





  Pradaxa Cap(Nf)  PO   150 mg





  BID IGOR   Administration


 


Docusate Sodium  100 mg  02/24/18 09:22  





  Colace Cap*  PO   





  BID PRN   





  CONSTIPATION   


 


Fluoxetine HCl  10 mg  02/23/18 09:00  02/24/18 08:57





  Prozac Cap*  PO   10 mg





  DAILY IGOR   Administration


 


Lactobacillus Rhamnosus  1 cap  02/22/18 21:00  02/24/18 08:57





  Culturelle*  PO   1 cap





  BID IGOR   Administration


 


Losartan Potassium  100 mg  02/21/18 09:00  02/24/18 08:57





  Cozaar Tab*  PO   100 mg





  DAILY IGOR   Administration


 


Magnesium Hydroxide  30 ml  02/20/18 13:28  





  Milk Of Magnesia Liq*  PO   





  Q6H PRN   





  CONSTIPATION   


 


Metoprolol Succinate  75 mg  02/21/18 09:00  02/24/18 08:56





  Toprol Xl Tab*  PO   75 mg





  DAILY IGOR   Administration


 


Nystatin  1 applic  02/22/18 21:00  02/24/18 09:02





  Nystatin Top Powder*  TOPICAL   1 applic





  BID IGOR   Administration


 


Senna  2 tab  02/20/18 13:28  





  Senokot Tab*  PO   





  BEDTIME PRN   





  CONSTIPATION   


 


Vancomycin HCl  125 mg  02/20/18 17:00  02/24/18 08:57





  Vancomycin Cap*  PO  02/28/18 23:55  125 mg





  QID IGOR   Administration











Vital Signs: 








 Vital Signs











  02/23/18 02/23/18 02/23/18





  16:10 18:00 20:00


 


Temperature 99.0 F  


 


Pulse Rate 64  


 


Respiratory 18  18





Rate   


 


Blood Pressure 170/100 112/62 





(mmHg)   


 


O2 Sat by Pulse 100  100





Oximetry   














  02/24/18 02/24/18





  06:32 08:50


 


Temperature 98.1 F 


 


Pulse Rate 57 


 


Respiratory 18 





Rate  


 


Blood Pressure 172/70 169/54





(mmHg)  


 


O2 Sat by Pulse 92 





Oximetry  








I checked her BP manually at the bedside while upright in bed and got 125/70.








Lab Results: 


 Laboratory Results - last 24 hr











  02/24/18





  05:31


 


WBC  13.6 H


 


RBC  4.86


 


Hgb  14.2


 


Hct  42


 


MCV  86


 


MCH  29


 


MCHC  34


 


RDW  14


 


Plt Count  331


 


MPV  9


 


Neut % (Auto)  68.2


 


Lymph % (Auto)  19.9 L


 


Mono % (Auto)  10.0 H


 


Eos % (Auto)  0.9


 


Baso % (Auto)  1.0


 


Absolute Neuts (auto)  9.3 H


 


Absolute Lymphs (auto)  2.7


 


Absolute Monos (auto)  1.4 H


 


Absolute Eos (auto)  0.1


 


Absolute Basos (auto)  0.1


 


Absolute Nucleated RBC  0


 


Nucleated RBC %  0











Exam: 





GEN: No acute distress. Alert and appropriate.


HEENT: L facial droop. Left gaze preference, but today she is better about 

addressing me on her right side.


LUNGS: Clear bilaterally


HEART: Regular rate and rhythm


ABDOMEN: Soft, + BS, non-tender, non-distended


NEUROLOGIC: Right side is flaccid. Dysarthric. 


EXTREMITIES: No edema.


SKIN: Right heel denuded (present on admission) with some mild drainage.  Does 

not appear infected.


Assessment/Plan: 


76yo woman s/p CVA with right hemiplegia, dysphagia, dysarthria, and cognitive 

impairment.





1. Left CVA, right hemiplegia: PT/OT/SLP. Pradaxa/Lipitor. Prozac started today.


2. Dysphagia: Pureed Texture, nectar thick liquids


3. Right heel ulcer: Doing mepilex dressings.  Multipodus boot with hourly 

checks and adjustments scheduled.


4. P. Atrial Fibrillation: Pradaxa/Lopressor


5. C. Diff: Oral Vanco until 2/28 and on Culturelle BID. Change colace to prn.


6. Hypertension: It is better once she is sitting up.  I do not want to add 

another agent and make her hypotensive.  HOB elevated to 30deg when in bed and 

if not sleeping, have vitals taken with her sitting up.


7. Mild leukocytosis: Currently treated for C.diff with oral vanco.  Given 

urine incontinence and stool incontinence will check UA.  Will need to do by 

straight cath. recheck cbc Monday. Leukocytosis may also be related to heel 

ulcer.


8. Advanced Directives: Has MOLST; DNR


9. Estimated LOS: 6 weeks.








02/24/18 10:09

## 2018-02-25 RX ADMIN — CIPROFLOXACIN HYDROCHLORIDE SCH MG: 250 TABLET, FILM COATED ORAL at 21:09

## 2018-02-25 RX ADMIN — DABIGATRAN ETEXILATE MESYLATE SCH MG: 150 CAPSULE ORAL at 09:02

## 2018-02-25 RX ADMIN — VANCOMYCIN HYDROCHLORIDE SCH MG: 125 CAPSULE ORAL at 16:59

## 2018-02-25 RX ADMIN — ATORVASTATIN CALCIUM SCH MG: 80 TABLET, FILM COATED ORAL at 16:59

## 2018-02-25 RX ADMIN — VANCOMYCIN HYDROCHLORIDE SCH MG: 125 CAPSULE ORAL at 12:40

## 2018-02-25 RX ADMIN — DABIGATRAN ETEXILATE MESYLATE SCH MG: 150 CAPSULE ORAL at 21:08

## 2018-02-25 RX ADMIN — LOSARTAN POTASSIUM SCH MG: 25 TABLET, FILM COATED ORAL at 09:01

## 2018-02-25 RX ADMIN — Medication SCH CAP: at 21:09

## 2018-02-25 RX ADMIN — NYSTATIN SCH APPLIC: 100000 POWDER TOPICAL at 09:10

## 2018-02-25 RX ADMIN — VANCOMYCIN HYDROCHLORIDE SCH MG: 125 CAPSULE ORAL at 21:09

## 2018-02-25 RX ADMIN — AMLODIPINE BESYLATE SCH MG: 5 TABLET ORAL at 21:09

## 2018-02-25 RX ADMIN — FLUOXETINE SCH MG: 10 CAPSULE ORAL at 09:02

## 2018-02-25 RX ADMIN — METOPROLOL SUCCINATE SCH MG: 50 TABLET, EXTENDED RELEASE ORAL at 09:01

## 2018-02-25 RX ADMIN — VANCOMYCIN HYDROCHLORIDE SCH MG: 125 CAPSULE ORAL at 09:01

## 2018-02-25 RX ADMIN — Medication SCH CAP: at 09:02

## 2018-02-25 RX ADMIN — NYSTATIN SCH APPLIC: 100000 POWDER TOPICAL at 21:16

## 2018-02-25 RX ADMIN — CIPROFLOXACIN HYDROCHLORIDE SCH MG: 250 TABLET, FILM COATED ORAL at 09:02

## 2018-02-25 NOTE — PN
Progress Note


Date of Service: 02/25/18


Note: 


NUNU PRYOR was visited. Nursing notes read and reviewed. Started cipro 

yesterday for UTI pending culture and sensitivities.  Also received hydralazine 

1x for SBP >180 and started amlodipine at HS. Seems to be tolerating well and 

BP better this morning.  No chest pain, shortness of breath or abdominal pain.  

A note was left for me from nursing that her daughter is requesting that patient

's vision and hearing be assessed on the right side.  Nursing tried to explain 

to family that these issues were part of her stroke with left gaze preference.








Current Medications: 


 Active Medications











Generic Name Dose Route Start Last Admin





  Trade Name Freq  PRN Reason Stop Dose Admin


 


Acetaminophen  650 mg  02/20/18 13:28  





  Tylenol Tab*  PO   





  Q6H PRN   





  FEVER/PAIN   


 


Amlodipine Besylate  5 mg  02/24/18 21:00  02/24/18 21:02





  Norvasc Tab*  PO   5 mg





  BEDTIME IGOR   Administration


 


Atorvastatin Calcium  80 mg  02/21/18 17:00  02/24/18 16:33





  Lipitor*  PO   80 mg





  1700 IGOR   Administration


 


Ciprofloxacin  250 mg  02/24/18 21:00  02/25/18 09:02





  Cipro Tab*  PO   250 mg





  BID IGOR   Administration


 


Dabigatran  150 mg  02/20/18 21:00  02/25/18 09:02





  Pradaxa Cap(Nf)  PO   150 mg





  BID IGOR   Administration


 


Docusate Sodium  100 mg  02/24/18 09:22  





  Colace Cap*  PO   





  BID PRN   





  CONSTIPATION   


 


Fluoxetine HCl  10 mg  02/23/18 09:00  02/25/18 09:02





  Prozac Cap*  PO   10 mg





  DAILY IGOR   Administration


 


Hydralazine HCl  5 mg  02/24/18 16:08  02/24/18 16:33





  Apresoline Tab*  PO   5 mg





  Q6H PRN   Administration





  SBP >180   


 


Lactobacillus Rhamnosus  1 cap  02/22/18 21:00  02/25/18 09:02





  Culturelle*  PO   1 cap





  BID IGOR   Administration


 


Losartan Potassium  100 mg  02/21/18 09:00  02/25/18 09:01





  Cozaar Tab*  PO   100 mg





  DAILY IGOR   Administration


 


Magnesium Hydroxide  30 ml  02/20/18 13:28  





  Milk Of Magnesia Liq*  PO   





  Q6H PRN   





  CONSTIPATION   


 


Metoprolol Succinate  75 mg  02/21/18 09:00  02/25/18 09:01





  Toprol Xl Tab*  PO   75 mg





  DAILY IGOR   Administration


 


Nystatin  1 applic  02/22/18 21:00  02/25/18 09:10





  Nystatin Top Powder*  TOPICAL   1 applic





  BID IGOR   Administration


 


Senna  2 tab  02/20/18 13:28  





  Senokot Tab*  PO   





  BEDTIME PRN   





  CONSTIPATION   


 


Vancomycin HCl  125 mg  02/20/18 17:00  02/25/18 09:01





  Vancomycin Cap*  PO  02/28/18 23:55  125 mg





  QID IGOR   Administration











Vital Signs: 


 


 Vital Signs











  02/24/18 02/24/18 02/24/18





  10:17 15:47 17:58


 


Temperature  97.9 F 


 


Pulse Rate  60 


 


Respiratory  18 





Rate   


 


Blood Pressure  185/105 134/78





(mmHg)   


 


O2 Sat by Pulse 92 93 





Oximetry   














  02/24/18 02/25/18 02/25/18





  20:00 04:28 10:00


 


Temperature  98.8 F 


 


Pulse Rate  82 


 


Respiratory 18 18 





Rate   


 


Blood Pressure 175/90 145/58 





(mmHg)   


 


O2 Sat by Pulse   91





Oximetry   














Lab Results: 


 Laboratory Results - last 24 hr











  02/24/18





  15:30


 


Urine Color  Yellow


 


Urine Appearance  Turbid


 


Urine pH  8.0


 


Ur Specific Orwell  1.011


 


Urine Protein  2+(100 mg/dl) H


 


Urine Ketones  Negative


 


Urine Blood  3+ H


 


Urine Nitrate  Positive H


 


Urine Bilirubin  Negative


 


Urine Urobilinogen  Negative


 


Ur Leukocyte Esterase  3+ H


 


Urine WBC (Auto)  3+(>20/hpf) H


 


Urine RBC (Auto)  3+(>10/hpf) H


 


Ur Transition Epith Cell  Present H


 


Urine Bacteria  Absent


 


Urine Yeast  Present H


 


Urine Glucose  Negative











Exam: 





GEN: No acute distress. Alert and appropriate.


HEENT: L facial droop. Left gaze preference, but improving.


LUNGS: Clear bilaterally


HEART: Regular rate and rhythm


ABDOMEN: Soft, + BS, non-tender, non-distended


NEUROLOGIC: CN II-XII intact except for right CN VII palsy.  EOMI. Able to hear 

bilaterally.  Visual fields intact. She is showing some trace hip adduction on 

the right.


EXTREMITIES: No edema.





Assessment/Plan: 


78yo woman s/p CVA with right hemiplegia, dysphagia, dysarthria, and cognitive 

impairment.





1. Left CVA, right hemiplegia: PT/OT/SLP. Pradaxa/Lipitor. Prozac started today.


2. Dysphagia: Pureed Texture, nectar thick liquids


3. Right heel ulcer: Doing mepilex dressings.  Multipodus boot with hourly 

checks and adjustments scheduled.


4. P. Atrial Fibrillation: Pradaxa/Lopressor


5. C. Diff: Oral Vanco until 2/28 and on Culturelle BID. Changed colace to prn.


6. Hypertension: Cozaar and Lopressor.  Amlodipine 5mg qhs added on 2/24/18, 

with prn hydralazine.


7. Mild leukocytosis: Currently treated for C.diff with oral vanco.  Possible 

UTI so cipro added 2/24/18 (today is day 2).  Oxygen saturations seem a bit 

lower.  She is an aspiration risk. Check CXR today and use incentive 

spirometer.  Leukocytosis may also be related to heel ulcer. Recheck CBC Monday.


8. Advanced Directives: Has MOLST; DNR


9. Estimated LOS: 6 weeks.





02/25/18 10:06

## 2018-02-25 NOTE — RAD
Indication: Hypoxia.



2 views of the chest demonstrates hyperinflated lung fields. No alveolar consolidation is

noted. No pleural fluid is identified. Osteopenia is noted.



IMPRESSION: Hyperinflated lung fields. No active cardiopulmonary disease is noted

## 2018-02-26 LAB
BASOPHILS # BLD AUTO: 0.1 10^3/UL (ref 0–0.2)
EOSINOPHIL # BLD AUTO: 0.1 10^3/UL (ref 0–0.6)
HCT VFR BLD AUTO: 42 % (ref 35–47)
HGB BLD-MCNC: 14.5 G/DL (ref 12–16)
LYMPHOCYTES # BLD AUTO: 2.3 10^3/UL (ref 1–4.8)
MCH RBC QN AUTO: 29 PG (ref 27–31)
MCHC RBC AUTO-ENTMCNC: 34 G/DL (ref 31–36)
MCV RBC AUTO: 85 FL (ref 80–97)
MONOCYTES # BLD AUTO: 1.4 10^3/UL (ref 0–0.8)
NEUTROPHILS # BLD AUTO: 9.2 10^3/UL (ref 1.5–7.7)
NRBC # BLD AUTO: 0 10^3/UL
NRBC BLD QL AUTO: 0.2
PLATELET # BLD AUTO: 411 10^3/UL (ref 150–450)
RBC # BLD AUTO: 5 10^6/UL (ref 4–5.4)
WBC # BLD AUTO: 13.1 10^3/UL (ref 3.5–10.8)

## 2018-02-26 RX ADMIN — FLUOXETINE SCH MG: 10 CAPSULE ORAL at 08:12

## 2018-02-26 RX ADMIN — LOSARTAN POTASSIUM SCH MG: 25 TABLET, FILM COATED ORAL at 08:12

## 2018-02-26 RX ADMIN — DABIGATRAN ETEXILATE MESYLATE SCH MG: 150 CAPSULE ORAL at 20:42

## 2018-02-26 RX ADMIN — ATORVASTATIN CALCIUM SCH MG: 80 TABLET, FILM COATED ORAL at 17:14

## 2018-02-26 RX ADMIN — METOPROLOL SUCCINATE SCH MG: 50 TABLET, EXTENDED RELEASE ORAL at 08:12

## 2018-02-26 RX ADMIN — NYSTATIN SCH APPLIC: 100000 POWDER TOPICAL at 08:25

## 2018-02-26 RX ADMIN — CIPROFLOXACIN HYDROCHLORIDE SCH MG: 250 TABLET, FILM COATED ORAL at 20:41

## 2018-02-26 RX ADMIN — VANCOMYCIN HYDROCHLORIDE SCH MG: 125 CAPSULE ORAL at 08:12

## 2018-02-26 RX ADMIN — DABIGATRAN ETEXILATE MESYLATE SCH MG: 150 CAPSULE ORAL at 08:12

## 2018-02-26 RX ADMIN — Medication SCH CAP: at 08:12

## 2018-02-26 RX ADMIN — NYSTATIN SCH APPLIC: 100000 POWDER TOPICAL at 20:42

## 2018-02-26 RX ADMIN — AMLODIPINE BESYLATE SCH MG: 5 TABLET ORAL at 20:41

## 2018-02-26 RX ADMIN — VANCOMYCIN HYDROCHLORIDE SCH MG: 125 CAPSULE ORAL at 17:14

## 2018-02-26 RX ADMIN — VANCOMYCIN HYDROCHLORIDE SCH MG: 125 CAPSULE ORAL at 20:42

## 2018-02-26 RX ADMIN — CIPROFLOXACIN HYDROCHLORIDE SCH MG: 250 TABLET, FILM COATED ORAL at 08:12

## 2018-02-26 RX ADMIN — VANCOMYCIN HYDROCHLORIDE SCH MG: 125 CAPSULE ORAL at 13:16

## 2018-02-26 RX ADMIN — Medication SCH CAP: at 20:42

## 2018-02-26 NOTE — PN
Progress Note


Date of Service: 02/26/18


Note: 


NUNU PRYOR was visited. Therapy notes read and reviewed. Her urine culture 

grew out Klebsiella and Providencia. She is on Cipro 250 BID. Await final 

sensitivities. She remains with almost no movement on right and a left gaze 

preference








Current Medications: 


 Active Medications











Generic Name Dose Route Start Last Admin





  Trade Name Freq  PRN Reason Stop Dose Admin


 


Acetaminophen  650 mg  02/20/18 13:28  





  Tylenol Tab*  PO   





  Q6H PRN   





  FEVER/PAIN   


 


Amlodipine Besylate  5 mg  02/24/18 21:00  02/25/18 21:09





  Norvasc Tab*  PO   5 mg





  BEDTIME IGOR   Administration


 


Atorvastatin Calcium  80 mg  02/21/18 17:00  02/26/18 17:14





  Lipitor*  PO   80 mg





  1700 IGOR   Administration


 


Ciprofloxacin  250 mg  02/24/18 21:00  02/26/18 08:12





  Cipro Tab*  PO   250 mg





  BID IGOR   Administration


 


Dabigatran  150 mg  02/20/18 21:00  02/26/18 08:12





  Pradaxa Cap(Nf)  PO   150 mg





  BID IGOR   Administration


 


Docusate Sodium  100 mg  02/24/18 09:22  





  Colace Cap*  PO   





  BID PRN   





  CONSTIPATION   


 


Fluoxetine HCl  10 mg  02/23/18 09:00  02/26/18 08:12





  Prozac Cap*  PO   10 mg





  DAILY IGOR   Administration


 


Hydralazine HCl  5 mg  02/24/18 16:08  02/24/18 16:33





  Apresoline Tab*  PO   5 mg





  Q6H PRN   Administration





  SBP >180   


 


Lactobacillus Rhamnosus  1 cap  02/22/18 21:00  02/26/18 08:12





  Culturelle*  PO   1 cap





  BID IGOR   Administration


 


Losartan Potassium  100 mg  02/21/18 09:00  02/26/18 08:12





  Cozaar Tab*  PO   100 mg





  DAILY IGOR   Administration


 


Magnesium Hydroxide  30 ml  02/20/18 13:28  





  Milk Of Magnesia Liq*  PO   





  Q6H PRN   





  CONSTIPATION   


 


Metoprolol Succinate  75 mg  02/21/18 09:00  02/26/18 08:12





  Toprol Xl Tab*  PO   75 mg





  DAILY IGOR   Administration


 


Nystatin  1 applic  02/22/18 21:00  02/26/18 08:25





  Nystatin Top Powder*  TOPICAL   1 applic





  BID IGOR   Administration


 


Senna  2 tab  02/20/18 13:28  





  Senokot Tab*  PO   





  BEDTIME PRN   





  CONSTIPATION   


 


Vancomycin HCl  125 mg  02/20/18 17:00  02/26/18 17:14





  Vancomycin Cap*  PO  02/28/18 23:55  125 mg





  QID IGOR   Administration











Vital Signs: 


 Vital Signs











Temp Pulse Resp BP Pulse Ox


 


 98.8 F   56   20   150/80   97 


 


 02/26/18 16:32  02/26/18 16:32  02/26/18 16:32  02/26/18 16:32  02/26/18 16:32











Lab Results: 


 Laboratory Results - last 24 hr











  02/26/18





  06:25


 


WBC  13.1 H


 


RBC  5.00


 


Hgb  14.5


 


Hct  42


 


MCV  85


 


MCH  29


 


MCHC  34


 


RDW  15


 


Plt Count  411


 


MPV  9


 


Neut % (Auto)  70.3


 


Lymph % (Auto)  17.3 L


 


Mono % (Auto)  10.4 H


 


Eos % (Auto)  1.0


 


Baso % (Auto)  1.0


 


Absolute Neuts (auto)  9.2 H


 


Absolute Lymphs (auto)  2.3


 


Absolute Monos (auto)  1.4 H


 


Absolute Eos (auto)  0.1


 


Absolute Basos (auto)  0.1


 


Absolute Nucleated RBC  0


 


Nucleated RBC %  0.2











Exam: 





HEENT: L facial droop. Left gaze preference, but improving.


LUNGS: Clear bilaterally


HEART: Regular rate and rhythm


ABDOMEN: Soft, + BS, non-tender, non-distended


NEUROLOGIC: alert. Some trace hip movements on right, nothing yet in arm. 


Assessment/Plan: 


1. Left CVA, right hemiplegia: PT/OT/SLP. Pradaxa/Lipitor. Will increase Prozac 

to 20 mg.


2. Dysphagia: Pureed Texture, nectar thick liquids


3. Right heel ulcer: Doing mepilex dressings.  Multipodus boot with hourly 

checks and adjustments scheduled.


4. P. Atrial Fibrillation: Pradaxa/Lopressor


5. C. Diff: Oral Vanco until 2/28 and on Culturelle BID. Still with diarrhea


6. Hypertension: Cozaar and Lopressor.  Amlodipine 5mg qhs added on 2/24/18, 

with prn hydralazine.


7. Mild leukocytosis: Currently treated for C.diff with oral vanco.  Possible 

UTI so cipro added 2/24/18 (today is day 2).  Oxygen saturations seem a bit 

lower.  CXR did not show pneumonia. Urine Cx: Klebsiella and Providencia


8. Advanced Directives: Hannah MOLST; DNR








02/26/18 17:51

## 2018-02-27 RX ADMIN — VANCOMYCIN HYDROCHLORIDE SCH MG: 125 CAPSULE ORAL at 19:49

## 2018-02-27 RX ADMIN — NYSTATIN SCH APPLIC: 100000 POWDER TOPICAL at 19:49

## 2018-02-27 RX ADMIN — FLUOXETINE SCH MG: 20 CAPSULE ORAL at 08:02

## 2018-02-27 RX ADMIN — CIPROFLOXACIN HYDROCHLORIDE SCH MG: 250 TABLET, FILM COATED ORAL at 19:49

## 2018-02-27 RX ADMIN — ATORVASTATIN CALCIUM SCH MG: 80 TABLET, FILM COATED ORAL at 17:30

## 2018-02-27 RX ADMIN — AMLODIPINE BESYLATE SCH MG: 5 TABLET ORAL at 19:49

## 2018-02-27 RX ADMIN — METOPROLOL SUCCINATE SCH MG: 50 TABLET, EXTENDED RELEASE ORAL at 08:01

## 2018-02-27 RX ADMIN — DABIGATRAN ETEXILATE MESYLATE SCH MG: 150 CAPSULE ORAL at 19:49

## 2018-02-27 RX ADMIN — LOSARTAN POTASSIUM SCH MG: 25 TABLET, FILM COATED ORAL at 08:05

## 2018-02-27 RX ADMIN — VANCOMYCIN HYDROCHLORIDE SCH MG: 125 CAPSULE ORAL at 08:01

## 2018-02-27 RX ADMIN — NYSTATIN SCH APPLIC: 100000 POWDER TOPICAL at 08:05

## 2018-02-27 RX ADMIN — Medication SCH CAP: at 19:49

## 2018-02-27 RX ADMIN — VANCOMYCIN HYDROCHLORIDE SCH MG: 125 CAPSULE ORAL at 17:30

## 2018-02-27 RX ADMIN — CIPROFLOXACIN HYDROCHLORIDE SCH MG: 250 TABLET, FILM COATED ORAL at 08:01

## 2018-02-27 RX ADMIN — Medication SCH CAP: at 08:01

## 2018-02-27 RX ADMIN — VANCOMYCIN HYDROCHLORIDE SCH MG: 125 CAPSULE ORAL at 15:01

## 2018-02-27 RX ADMIN — DABIGATRAN ETEXILATE MESYLATE SCH MG: 150 CAPSULE ORAL at 08:01

## 2018-02-27 NOTE — PN
Progress Note


Date of Service: 02/27/18


Note: 


NUNU PRYOR was visited. Therapy notes read and reviewed. She was discussed 

in interdisciplinary team rounds. Some non-functional movement in RLE. She 

seems in good spirits and working with therapy








Current Medications: 


 Active Medications











Generic Name Dose Route Start Last Admin





  Trade Name Freq  PRN Reason Stop Dose Admin


 


Acetaminophen  650 mg  02/20/18 13:28  





  Tylenol Tab*  PO   





  Q6H PRN   





  FEVER/PAIN   


 


Amlodipine Besylate  5 mg  02/24/18 21:00  02/26/18 20:41





  Norvasc Tab*  PO   5 mg





  BEDTIME IGOR   Administration


 


Atorvastatin Calcium  80 mg  02/21/18 17:00  02/27/18 17:30





  Lipitor*  PO   80 mg





  1700 IGOR   Administration


 


Ciprofloxacin  250 mg  02/24/18 21:00  02/27/18 08:01





  Cipro Tab*  PO   250 mg





  BID IGOR   Administration


 


Dabigatran  150 mg  02/20/18 21:00  02/27/18 08:01





  Pradaxa Cap(Nf)  PO   150 mg





  BID IGOR   Administration


 


Docusate Sodium  100 mg  02/24/18 09:22  





  Colace Cap*  PO   





  BID PRN   





  CONSTIPATION   


 


Fluoxetine HCl  20 mg  02/26/18 17:54  02/27/18 08:02





  Prozac Cap*  PO   20 mg





  DAILY IGOR   Administration


 


Hydralazine HCl  5 mg  02/24/18 16:08  02/24/18 16:33





  Apresoline Tab*  PO   5 mg





  Q6H PRN   Administration





  SBP >180   


 


Lactobacillus Rhamnosus  1 cap  02/22/18 21:00  02/27/18 08:01





  Culturelle*  PO   1 cap





  BID IGOR   Administration


 


Losartan Potassium  100 mg  02/21/18 09:00  02/27/18 08:05





  Cozaar Tab*  PO   100 mg





  DAILY IGOR   Administration


 


Magnesium Hydroxide  30 ml  02/20/18 13:28  





  Milk Of Magnesia Liq*  PO   





  Q6H PRN   





  CONSTIPATION   


 


Metoprolol Succinate  75 mg  02/21/18 09:00  02/27/18 08:01





  Toprol Xl Tab*  PO   75 mg





  DAILY IGOR   Administration


 


Nystatin  1 applic  02/22/18 21:00  02/27/18 08:05





  Nystatin Top Powder*  TOPICAL   1 applic





  BID IGOR   Administration


 


Senna  2 tab  02/20/18 13:28  





  Senokot Tab*  PO   





  BEDTIME PRN   





  CONSTIPATION   


 


Vancomycin HCl  125 mg  02/20/18 17:00  02/27/18 17:30





  Vancomycin Cap*  PO  03/01/18 23:55  125 mg





  QID IGOR   Administration











Vital Signs: 


 Vital Signs











Temp Pulse Resp BP Pulse Ox


 


 97.3 F   57   18   139/70   92 


 


 02/27/18 16:27  02/27/18 16:27  02/27/18 16:27  02/27/18 16:27  02/27/18 16:27











Exam: 





HEENT: L facial droop. Left gaze preference, but improving.


LUNGS: Clear bilaterally


HEART: Regular rate and rhythm


ABDOMEN: Soft, + BS, non-tender, non-distended


NEUROLOGIC: alert. Some trace hamstring movements on right, nothing yet in arm. 


Assessment/Plan: 


1. Left CVA, right hemiplegia: PT/OT/SLP. Pradaxa/Lipitor. Increased Prozac to 

20 mg.


2. Dysphagia: Pureed Texture, nectar thick liquids


3. Right heel ulcer: Doing mepilex dressings.  Multipodus boot with hourly 

checks and adjustments scheduled.


4. P. Atrial Fibrillation: Pradaxa/Lopressor


5. C. Diff: Oral Vanco until 3/1 and on Culturelle BID. Still with diarrhea. I 

have asked ID to see. 


6. Hypertension: Cozaar and Lopressor.  Amlodipine 5mg qhs added on 2/24/18, 

with prn hydralazine.


7. Mild leukocytosis: Currently treated for C.diff with oral vanco.  UTI on 

cipro day #4. CXR did not show pneumonia. Urine Cx: Klebsiella and Providencia 

sens to Cipro


8. Advanced Directives: Has MOLST; DNR








02/27/18 18:08

## 2018-02-28 LAB
BASOPHILS # BLD AUTO: 0.1 10^3/UL (ref 0–0.2)
EOSINOPHIL # BLD AUTO: 0.1 10^3/UL (ref 0–0.6)
HCT VFR BLD AUTO: 47 % (ref 35–47)
HGB BLD-MCNC: 16 G/DL (ref 12–16)
LYMPHOCYTES # BLD AUTO: 2.4 10^3/UL (ref 1–4.8)
MCH RBC QN AUTO: 30 PG (ref 27–31)
MCHC RBC AUTO-ENTMCNC: 34 G/DL (ref 31–36)
MCV RBC AUTO: 86 FL (ref 80–97)
MONOCYTES # BLD AUTO: 0.8 10^3/UL (ref 0–0.8)
NEUTROPHILS # BLD AUTO: 11 10^3/UL (ref 1.5–7.7)
NRBC # BLD AUTO: 0 10^3/UL
NRBC BLD QL AUTO: 0.1
PLATELET # BLD AUTO: 469 10^3/UL (ref 150–450)
RBC # BLD AUTO: 5.43 10^6/UL (ref 4–5.4)
WBC # BLD AUTO: 14.3 10^3/UL (ref 3.5–10.8)

## 2018-02-28 RX ADMIN — DABIGATRAN ETEXILATE MESYLATE SCH MG: 150 CAPSULE ORAL at 08:38

## 2018-02-28 RX ADMIN — NYSTATIN SCH APPLIC: 100000 POWDER TOPICAL at 08:59

## 2018-02-28 RX ADMIN — VANCOMYCIN HYDROCHLORIDE SCH MG: 125 CAPSULE ORAL at 14:44

## 2018-02-28 RX ADMIN — VANCOMYCIN HYDROCHLORIDE SCH MG: 125 CAPSULE ORAL at 17:12

## 2018-02-28 RX ADMIN — FLUOXETINE SCH MG: 20 CAPSULE ORAL at 08:38

## 2018-02-28 RX ADMIN — ATORVASTATIN CALCIUM SCH MG: 80 TABLET, FILM COATED ORAL at 17:12

## 2018-02-28 RX ADMIN — Medication SCH CAP: at 20:05

## 2018-02-28 RX ADMIN — CIPROFLOXACIN HYDROCHLORIDE SCH MG: 250 TABLET, FILM COATED ORAL at 20:06

## 2018-02-28 RX ADMIN — CIPROFLOXACIN HYDROCHLORIDE SCH MG: 250 TABLET, FILM COATED ORAL at 08:38

## 2018-02-28 RX ADMIN — VANCOMYCIN HYDROCHLORIDE SCH MG: 125 CAPSULE ORAL at 08:38

## 2018-02-28 RX ADMIN — METOPROLOL SUCCINATE SCH MG: 50 TABLET, EXTENDED RELEASE ORAL at 08:37

## 2018-02-28 RX ADMIN — DABIGATRAN ETEXILATE MESYLATE SCH MG: 150 CAPSULE ORAL at 20:06

## 2018-02-28 RX ADMIN — Medication SCH CAP: at 08:38

## 2018-02-28 RX ADMIN — NYSTATIN SCH APPLIC: 100000 POWDER TOPICAL at 20:14

## 2018-02-28 RX ADMIN — LOSARTAN POTASSIUM SCH MG: 25 TABLET, FILM COATED ORAL at 08:37

## 2018-02-28 RX ADMIN — VANCOMYCIN HYDROCHLORIDE SCH MG: 125 CAPSULE ORAL at 20:05

## 2018-02-28 RX ADMIN — AMLODIPINE BESYLATE SCH MG: 5 TABLET ORAL at 20:05

## 2018-02-28 NOTE — PN
Progress Note


Date of Service: 02/28/18


Note: 


NUNU PRYOR was visited. Therapy notes read and reviewed. Slow improvement 

in vision. Tolerating Prozac. Diarrhea may be slowing slightly (2 BMs today vs. 

4 yesterday)








Current Medications: 


 Active Medications











Generic Name Dose Route Start Last Admin





  Trade Name Freq  PRN Reason Stop Dose Admin


 


Acetaminophen  650 mg  02/20/18 13:28  





  Tylenol Tab*  PO   





  Q6H PRN   





  FEVER/PAIN   


 


Amlodipine Besylate  5 mg  02/24/18 21:00  02/28/18 20:05





  Norvasc Tab*  PO   5 mg





  BEDTIME IGOR   Administration


 


Atorvastatin Calcium  80 mg  02/21/18 17:00  02/28/18 17:12





  Lipitor*  PO   80 mg





  1700 IGOR   Administration


 


Ciprofloxacin  250 mg  02/24/18 21:00  02/28/18 20:06





  Cipro Tab*  PO   250 mg





  BID IGOR   Administration


 


Dabigatran  150 mg  02/20/18 21:00  02/28/18 20:06





  Pradaxa Cap(Nf)  PO   150 mg





  BID IGOR   Administration


 


Docusate Sodium  100 mg  02/24/18 09:22  





  Colace Cap*  PO   





  BID PRN   





  CONSTIPATION   


 


Fluoxetine HCl  20 mg  02/26/18 17:54  02/28/18 08:38





  Prozac Cap*  PO   20 mg





  DAILY IGOR   Administration


 


Hydralazine HCl  5 mg  02/24/18 16:08  02/24/18 16:33





  Apresoline Tab*  PO   5 mg





  Q6H PRN   Administration





  SBP >180   


 


Lactobacillus Rhamnosus  1 cap  02/22/18 21:00  02/28/18 20:05





  Culturelle*  PO   1 cap





  BID IGOR   Administration


 


Losartan Potassium  100 mg  02/21/18 09:00  02/28/18 08:37





  Cozaar Tab*  PO   100 mg





  DAILY IGOR   Administration


 


Magnesium Hydroxide  30 ml  02/20/18 13:28  





  Milk Of Magnesia Liq*  PO   





  Q6H PRN   





  CONSTIPATION   


 


Metoprolol Succinate  75 mg  02/21/18 09:00  02/28/18 08:37





  Toprol Xl Tab*  PO   75 mg





  DAILY IGOR   Administration


 


Nystatin  1 applic  02/22/18 21:00  02/28/18 20:14





  Nystatin Top Powder*  TOPICAL   1 applic





  BID IGOR   Administration


 


Senna  2 tab  02/20/18 13:28  





  Senokot Tab*  PO   





  BEDTIME PRN   





  CONSTIPATION   


 


Vancomycin HCl  125 mg  02/20/18 17:00  02/28/18 20:05





  Vancomycin Cap*  PO  03/01/18 23:55  125 mg





  QID IGOR   Administration











Vital Signs: 


 Vital Signs











Temp Pulse Resp BP Pulse Ox


 


 98.0 F   55   20   132/68   93 


 


 02/28/18 16:54  02/28/18 16:54  02/28/18 16:54  02/28/18 16:54  02/28/18 16:54











Lab Results: 


 Laboratory Results - last 24 hr











  02/28/18 02/28/18





  08:34 08:34


 


WBC  14.3 H 


 


RBC  5.43 H 


 


Hgb  16.0 


 


Hct  47 


 


MCV  86 


 


MCH  30 


 


MCHC  34 


 


RDW  15 


 


Plt Count  469 H D 


 


MPV  9 


 


Neut % (Auto)  76.6 


 


Lymph % (Auto)  16.9 L 


 


Mono % (Auto)  5.3 


 


Eos % (Auto)  0.6 


 


Baso % (Auto)  0.6 


 


Absolute Neuts (auto)  11.0 H 


 


Absolute Lymphs (auto)  2.4 


 


Absolute Monos (auto)  0.8 


 


Absolute Eos (auto)  0.1 


 


Absolute Basos (auto)  0.1 


 


Absolute Nucleated RBC  0 


 


Nucleated RBC %  0.1 


 


Sodium   136


 


Potassium   3.2 L


 


Chloride   101


 


Carbon Dioxide   26


 


Anion Gap   9


 


BUN   22


 


Creatinine   0.77


 


Est GFR ( Amer)   93.0


 


Est GFR (Non-Af Amer)   72.3


 


BUN/Creatinine Ratio   28.6 H


 


Glucose   185 H


 


Calcium   9.4


 


Total Bilirubin   0.60


 


AST   21


 


ALT   22


 


Alkaline Phosphatase   122 H


 


Total Protein   6.9


 


Albumin   3.5


 


Globulin   3.4


 


Albumin/Globulin Ratio   1.0











Exam: 





HEENT: L facial droop. Left gaze preference, but improving.


LUNGS: Clear bilaterally


HEART: Regular rate and rhythm


ABDOMEN: Soft, + BS, non-tender, non-distended


NEUROLOGIC: alert. Some trace hamstring movements on right, nothing yet in arm. 


Assessment/Plan: 


1. Left CVA, right hemiplegia: PT/OT/SLP. Pradaxa/Lipitor. Increased Prozac to 

20 mg.


2. Dysphagia: Pureed Texture, nectar thick liquids


3. Right heel ulcer: Doing mepilex dressings.  Multipodus boot with hourly 

checks and adjustments scheduled.


4. P. Atrial Fibrillation: Pradaxa/Lopressor


5. C. Diff: Oral Vanco until 3/1 and on Culturelle BID. Still with diarrhea. I 

have asked ID to see. 


6. Hypertension: Cozaar and Lopressor.  Amlodipine 5mg qhs added on 2/24/18, 

with prn hydralazine.


7. Mild leukocytosis: Currently treated for C.diff with oral vanco.  UTI on 

cipro day #5. CXR did not show pneumonia. Urine Cx: Klebsiella and Providencia 

sens to Cipro


8. Advanced Directives: Has MOLST; DNR











02/28/18 20:38

## 2018-03-01 RX ADMIN — NYSTATIN SCH APPLIC: 100000 POWDER TOPICAL at 09:04

## 2018-03-01 RX ADMIN — CIPROFLOXACIN HYDROCHLORIDE SCH MG: 250 TABLET, FILM COATED ORAL at 09:04

## 2018-03-01 RX ADMIN — VANCOMYCIN HYDROCHLORIDE SCH MG: 125 CAPSULE ORAL at 15:12

## 2018-03-01 RX ADMIN — FLUOXETINE SCH MG: 20 CAPSULE ORAL at 09:04

## 2018-03-01 RX ADMIN — DABIGATRAN ETEXILATE MESYLATE SCH MG: 150 CAPSULE ORAL at 09:04

## 2018-03-01 RX ADMIN — Medication SCH CAP: at 09:04

## 2018-03-01 RX ADMIN — VANCOMYCIN HYDROCHLORIDE SCH MG: 125 CAPSULE ORAL at 20:49

## 2018-03-01 RX ADMIN — NYSTATIN SCH APPLIC: 100000 POWDER TOPICAL at 21:04

## 2018-03-01 RX ADMIN — ATORVASTATIN CALCIUM SCH MG: 80 TABLET, FILM COATED ORAL at 17:15

## 2018-03-01 RX ADMIN — Medication SCH CAP: at 20:49

## 2018-03-01 RX ADMIN — DABIGATRAN ETEXILATE MESYLATE SCH MG: 150 CAPSULE ORAL at 20:49

## 2018-03-01 RX ADMIN — AMLODIPINE BESYLATE SCH MG: 5 TABLET ORAL at 20:49

## 2018-03-01 RX ADMIN — LOSARTAN POTASSIUM SCH MG: 25 TABLET, FILM COATED ORAL at 09:04

## 2018-03-01 RX ADMIN — METOPROLOL SUCCINATE SCH MG: 50 TABLET, EXTENDED RELEASE ORAL at 09:04

## 2018-03-01 RX ADMIN — VANCOMYCIN HYDROCHLORIDE SCH MG: 125 CAPSULE ORAL at 09:04

## 2018-03-01 NOTE — PN
Progress Note


Date of Service: 03/01/18


Note: 


NUNU PRYOR was visited. Therapy notes read and reviewed. Appreciate Dr. Salomon's help. Her Cipro was stopped and she will continue Vanco for a few 

more days. 








Current Medications: 


 Active Medications











Generic Name Dose Route Start Last Admin





  Trade Name Freq  PRN Reason Stop Dose Admin


 


Acetaminophen  650 mg  02/20/18 13:28  





  Tylenol Tab*  PO   





  Q6H PRN   





  FEVER/PAIN   


 


Amlodipine Besylate  5 mg  02/24/18 21:00  02/28/18 20:05





  Norvasc Tab*  PO   5 mg





  BEDTIME IGOR   Administration


 


Atorvastatin Calcium  80 mg  02/21/18 17:00  03/01/18 17:15





  Lipitor*  PO   80 mg





  1700 IGOR   Administration


 


Dabigatran  150 mg  02/20/18 21:00  03/01/18 09:04





  Pradaxa Cap(Nf)  PO   150 mg





  BID IGOR   Administration


 


Docusate Sodium  100 mg  02/24/18 09:22  





  Colace Cap*  PO   





  BID PRN   





  CONSTIPATION   


 


Fluoxetine HCl  20 mg  02/26/18 17:54  03/01/18 09:04





  Prozac Cap*  PO   20 mg





  DAILY IGOR   Administration


 


Hydralazine HCl  5 mg  02/24/18 16:08  02/24/18 16:33





  Apresoline Tab*  PO   5 mg





  Q6H PRN   Administration





  SBP >180   


 


Lactobacillus Rhamnosus  1 cap  02/22/18 21:00  03/01/18 09:04





  Culturelle*  PO   1 cap





  BID IGOR   Administration


 


Losartan Potassium  100 mg  02/21/18 09:00  03/01/18 09:04





  Cozaar Tab*  PO   100 mg





  DAILY IGOR   Administration


 


Magnesium Hydroxide  30 ml  02/20/18 13:28  





  Milk Of Magnesia Liq*  PO   





  Q6H PRN   





  CONSTIPATION   


 


Metoprolol Succinate  75 mg  02/21/18 09:00  03/01/18 09:04





  Toprol Xl Tab*  PO   75 mg





  DAILY IGOR   Administration


 


Nystatin  1 applic  02/22/18 21:00  03/01/18 09:04





  Nystatin Top Powder*  TOPICAL   1 applic





  BID IGOR   Administration


 


Senna  2 tab  02/20/18 13:28  





  Senokot Tab*  PO   





  BEDTIME PRN   





  CONSTIPATION   


 


Vancomycin HCl  125 mg  03/01/18 14:00  03/01/18 15:12





  Vancomycin Cap*  PO  03/06/18 23:55  125 mg





  TID IGOR   Administration











Vital Signs: 


 Vital Signs











Temp Pulse Resp BP Pulse Ox


 


 98.2 F   52   18   120/70   93 


 


 03/01/18 16:44  03/01/18 16:44  03/01/18 16:44  03/01/18 16:44  03/01/18 16:44











Exam: 








HEENT: L facial droop. Left gaze preference, but improving.


LUNGS: Clear bilaterally


HEART: Regular rate and rhythm


ABDOMEN: Soft, + BS, non-tender, non-distended


NEUROLOGIC: alert. Some trace hamstring movements on right, nothing yet in arm. 


Assessment/Plan: 


1. Left CVA, right hemiplegia: PT/OT/SLP. Pradaxa/Lipitor. Increased Prozac to 

20 mg.


2. Dysphagia: Pureed Texture, nectar thick liquids


3. Right heel ulcer: Doing mepilex dressings.  Multipodus boot with hourly 

checks and adjustments scheduled.


4. P. Atrial Fibrillation: Pradaxa/Lopressor


5. C. Diff: Oral Vanco  and on Culturelle BID. Still with diarrhea. Dr. Salomon recommends keeping oral Vanco going a few more days and monitor BMs 

off Cipro


6. Hypertension: Cozaar and Lopressor.  Amlodipine 5mg qhs added on 2/24/18, 

with prn hydralazine.


7. Advanced Directives: Has MOLST; DNR





03/01/18 19:34

## 2018-03-01 NOTE — CONS
CONSULTATION REPORT:

 

DATE OF CONSULT:  03/01/18

 

REQUESTING PHYSICIAN:  Dr. Lora.

 

CONSULTING SERVICE:  Infectious Disease.

 

REASON FOR CONSULTATION:  Diarrhea.

 

IMPRESSION:

1.  Recently diagnosed with C. difficile colitis at [facility].  Not clear 
if she had preceding antibiotic exposure.  She was there for a CVA.  She 
reports frequent liquid stools and some gradual improvement on vancomycin, 
which she has been taking 4 times a day for 2 weeks.  She is down to she thinks 
every 3 to 4 hours to every 2 hours with loose or soft stools.  No abdominal 
pain.  This could be resolving C. difficile colitis or there could be other 
etiology including a postinfectious irritable bowel versus medication side 
effects.  She has been on Cipro recently for urinary tract infection.

2.  Cerebrovascular accident with hemiparesis.

3.  Allergies to PENICILLIN and SULFA.

4.  Urine culture grew Klebsiella and Providencia on 02/24/18.  She has had 6 
days of ciprofloxacin.

 

RECOMMENDATIONS:  We will continue vancomycin and decrease it to 3 times a day. 
Follow her stools here and see if she is continuing to have improvement.  If not
, we will need to broaden the search for other causes.  We will also stop 
ciprofloxacin.

 

HISTORY OF PRESENT ILLNESS:  A 77-year-old woman who transferred to the rehab 
unit here from [facility], she had been taken from home by an 
ambulance there on 02/11/18  for what was found to be a CVA.  She had total 
occlusion of the left internal carotid.  There are no particular interventions 
done given the timing of events.  She was anticoagulated for new diagnosis of 
atrial fibrillation.  She has had some loose stools, so a C. diff test was sent 
and was found to be positive on 02/17/18 and has been on Flagyl and vancomycin, 
which was to finish on 02/28/18. She recalls multiple liquid stools per day 
without abdominal pain or fever.  I am not sure if she had had antibiotics 
beforehand.  She thinks her bowel movements have been slowly improving and to 
the point of having a bowel movement every 3 to 4 hours, which is soft or loose 
and less liquid.  She continues to have no fever, abdominal pain, or night 
sweats.

 

PAST MEDICAL HISTORY:

1.  Stroke in February 2018 with hemiparesis.

2.  Hypertension.

3.  Atrial fibrillation.

 

ALLERGIES:  ASPIRIN, BENADRYL, PENICILLIN, and SULFA.

 

MEDICATIONS:

1.  Tylenol.

2.  Amlodipine.

3.  Lipitor.

4.  Pradaxa.

5.  Docusate.

6.  Fluoxetine.

7.  Hydralazine as needed.

8.  Lactobacillus.

9.  Losartan.

10.  Magnesium.

11.  Metoprolol.

12.  Nystatin topical powder.

13.  Vancomycin 125 mg by mouth 4 times a day.

14.  Senna.

15.  Cipro 500 mg by mouth twice daily.

 

SOCIAL HISTORY:  She lives in [place] next door to her daughter.  No sick 
contacts.

 

FAMILY HISTORY:  No recurrent infections.

 

REVIEW OF SYSTEMS:  A 14-point review of systems was negative except as noted 
above.

 

PHYSICAL EXAM:  Vital Signs:  Temperature is 37, heart rate 60, respiratory 
rate 18, blood pressure 150/70, oxygen saturation 91% on room air.  In general, 
she is awake, not in distress.  Neurologic:  She is oriented x3.  Follows all 
commands. Answers questions.  She has a right facial droop.  Neck is supple 
without nuchal rigidity.  Lymph Nodes:  There is no inguinal, axillary, or 
epitrochlear lymphadenopathy.  Heart is regular rate and rhythm without murmurs
, rubs, or gallops.  Lungs are clear to auscultation bilaterally.  Abdomen: Soft
, nontender, nondistended. There are bowel sounds present.  There is no rebound 
tenderness. Skin:  There is no rash or splinter hemorrhages.

 

LABORATORY DATA:  White blood cell count 14, hemoglobin 16, platelets 469. 
Creatinine is 0.7.  Urinalysis from 

02/24/18 showed leukocyte esterase, white cells, red cells, nitrite positive.

 

Please see impressions and recommendations outlined above, which I have 
discussed with Dr. Lora.

 

Thank you for asking me to see Ms. Suarez in consultation.

 

 [id number]/[facility] #: [id number]

[facility]ITTA

## 2018-03-02 LAB
BASOPHILS # BLD AUTO: 0.2 10^3/UL (ref 0–0.2)
EOSINOPHIL # BLD AUTO: 0.1 10^3/UL (ref 0–0.6)
HCT VFR BLD AUTO: 43 % (ref 35–47)
HGB BLD-MCNC: 14.3 G/DL (ref 12–16)
LYMPHOCYTES # BLD AUTO: 2.7 10^3/UL (ref 1–4.8)
MCH RBC QN AUTO: 29 PG (ref 27–31)
MCHC RBC AUTO-ENTMCNC: 33 G/DL (ref 31–36)
MCV RBC AUTO: 86 FL (ref 80–97)
MONOCYTES # BLD AUTO: 0.7 10^3/UL (ref 0–0.8)
NEUTROPHILS # BLD AUTO: 7.2 10^3/UL (ref 1.5–7.7)
NRBC # BLD AUTO: 0 10^3/UL
NRBC BLD QL AUTO: 0.1
PLATELET # BLD AUTO: 375 10^3/UL (ref 150–450)
RBC # BLD AUTO: 4.97 10^6/UL (ref 4–5.4)
WBC # BLD AUTO: 10.8 10^3/UL (ref 3.5–10.8)

## 2018-03-02 RX ADMIN — DABIGATRAN ETEXILATE MESYLATE SCH MG: 150 CAPSULE ORAL at 09:43

## 2018-03-02 RX ADMIN — VANCOMYCIN HYDROCHLORIDE SCH MG: 125 CAPSULE ORAL at 20:53

## 2018-03-02 RX ADMIN — METOPROLOL SUCCINATE SCH MG: 50 TABLET, EXTENDED RELEASE ORAL at 09:43

## 2018-03-02 RX ADMIN — VANCOMYCIN HYDROCHLORIDE SCH MG: 125 CAPSULE ORAL at 09:45

## 2018-03-02 RX ADMIN — Medication SCH CAP: at 09:43

## 2018-03-02 RX ADMIN — AMLODIPINE BESYLATE SCH MG: 5 TABLET ORAL at 20:53

## 2018-03-02 RX ADMIN — FLUOXETINE SCH MG: 20 CAPSULE ORAL at 09:43

## 2018-03-02 RX ADMIN — Medication SCH CAP: at 20:53

## 2018-03-02 RX ADMIN — ATORVASTATIN CALCIUM SCH MG: 80 TABLET, FILM COATED ORAL at 17:14

## 2018-03-02 RX ADMIN — LOSARTAN POTASSIUM SCH MG: 25 TABLET, FILM COATED ORAL at 09:56

## 2018-03-02 RX ADMIN — VANCOMYCIN HYDROCHLORIDE SCH MG: 125 CAPSULE ORAL at 12:47

## 2018-03-02 RX ADMIN — NYSTATIN SCH APPLIC: 100000 POWDER TOPICAL at 09:44

## 2018-03-02 RX ADMIN — POTASSIUM CHLORIDE SCH MEQ: 1500 TABLET, EXTENDED RELEASE ORAL at 20:53

## 2018-03-02 RX ADMIN — NYSTATIN SCH APPLIC: 100000 POWDER TOPICAL at 21:46

## 2018-03-02 RX ADMIN — DABIGATRAN ETEXILATE MESYLATE SCH MG: 150 CAPSULE ORAL at 20:53

## 2018-03-02 NOTE — PN
Progress Note


Date of Service: 03/02/18


Note: 


NUNU PRYOR was visited. Nursing and therapy notes read and reviewed.  Felt 

a little jittery in OT this morning but it was short-lived and she continued 

treatment session. No chest pain, shortness of breath or abdominal pain.  She 

says her diarrhea has stopped.








Current Medications: 


 Active Medications











Generic Name Dose Route Start Last Admin





  Trade Name Freq  PRN Reason Stop Dose Admin


 


Acetaminophen  650 mg  02/20/18 13:28  





  Tylenol Tab*  PO   





  Q6H PRN   





  FEVER/PAIN   


 


Amlodipine Besylate  5 mg  02/24/18 21:00  03/01/18 20:49





  Norvasc Tab*  PO   5 mg





  BEDTIME IGOR   Administration


 


Atorvastatin Calcium  80 mg  02/21/18 17:00  03/01/18 17:15





  Lipitor*  PO   80 mg





  1700 IGOR   Administration


 


Dabigatran  150 mg  02/20/18 21:00  03/02/18 09:43





  Pradaxa Cap(Nf)  PO   150 mg





  BID IGOR   Administration


 


Docusate Sodium  100 mg  02/24/18 09:22  





  Colace Cap*  PO   





  BID PRN   





  CONSTIPATION   


 


Fluoxetine HCl  20 mg  02/26/18 17:54  03/02/18 09:43





  Prozac Cap*  PO   20 mg





  DAILY IGOR   Administration


 


Hydralazine HCl  5 mg  02/24/18 16:08  02/24/18 16:33





  Apresoline Tab*  PO   5 mg





  Q6H PRN   Administration





  SBP >180   


 


Lactobacillus Rhamnosus  1 cap  02/22/18 21:00  03/02/18 09:43





  Culturelle*  PO   1 cap





  BID IGOR   Administration


 


Losartan Potassium  100 mg  02/21/18 09:00  03/02/18 09:56





  Cozaar Tab*  PO   100 mg





  DAILY IGOR   Administration


 


Magnesium Hydroxide  30 ml  02/20/18 13:28  





  Milk Of Magnesia Liq*  PO   





  Q6H PRN   





  CONSTIPATION   


 


Metoprolol Succinate  75 mg  02/21/18 09:00  03/02/18 09:43





  Toprol Xl Tab*  PO   75 mg





  DAILY IGOR   Administration


 


Nystatin  1 applic  02/22/18 21:00  03/02/18 09:44





  Nystatin Top Powder*  TOPICAL   1 applic





  BID IGOR   Administration


 


Senna  2 tab  02/20/18 13:28  





  Senokot Tab*  PO   





  BEDTIME PRN   





  CONSTIPATION   


 


Vancomycin HCl  125 mg  03/01/18 14:00  03/02/18 09:45





  Vancomycin Cap*  PO  03/06/18 23:55  125 mg





  TID IGOR   Administration











Vital Signs: 


 Vital Signs











Temp Pulse Resp BP Pulse Ox


 


 99.0 F   57   20   132/54   92 


 


 03/02/18 06:28  03/02/18 06:28  03/02/18 08:00  03/02/18 06:28  03/02/18 08:00











Exam: 





GEN: No acute distress. Alert and appropriate.


HEENT: L facial droop. Left gaze preference.


LUNGS: Clear bilaterally


HEART: Regular rate and rhythm


ABDOMEN: Soft, + BS, non-tender, non-distended


NEUROLOGIC: She is showing some right shoulder shrug and trace movement of her 

right hip.


EXTREMITIES: No edema.


Assessment/Plan: 


78yo woman s/p CVA with right hemiplegia, dysphagia, dysarthria, and cognitive 

impairment.





1. Left CVA, right hemiplegia: PT/OT/SLP. Pradaxa/Lipitor. Prozac to 20 mg.


2. Dysphagia: Pureed Texture, nectar thick liquids


3. Right heel ulcer: mepilex dressings.  Multipodus boot with hourly checks and 

adjustments scheduled.


4. P. Atrial Fibrillation: Pradaxa/Lopressor


5. C. Diff: Oral Vanco and on Culturelle BID. Diarrhea has slowed down now off 

cipro. Dr. Salomon recommends keeping oral Vanco going a few more days and 

monitor BMs off Cipro


6. Hypertension: Cozaar and Lopressor.  Amlodipine 5mg qhs added on 2/24/18, 

with prn hydralazine.


7. Advanced Directives: Has MOLST; DNR


8. Noted ongoing leukocytosis on 2/28 and some hypokalemia.  Will recheck labs 

today.





03/02/18 10:19

## 2018-03-03 RX ADMIN — VANCOMYCIN HYDROCHLORIDE SCH MG: 125 CAPSULE ORAL at 09:54

## 2018-03-03 RX ADMIN — MAGNESIUM OXIDE TAB 400 MG (241.3 MG ELEMENTAL MG) SCH MG: 400 (241.3 MG) TAB at 09:53

## 2018-03-03 RX ADMIN — DABIGATRAN ETEXILATE MESYLATE SCH MG: 150 CAPSULE ORAL at 20:45

## 2018-03-03 RX ADMIN — AMLODIPINE BESYLATE SCH MG: 5 TABLET ORAL at 20:56

## 2018-03-03 RX ADMIN — LOSARTAN POTASSIUM SCH MG: 25 TABLET, FILM COATED ORAL at 09:52

## 2018-03-03 RX ADMIN — NYSTATIN SCH APPLIC: 100000 POWDER TOPICAL at 10:38

## 2018-03-03 RX ADMIN — FLUOXETINE SCH MG: 20 CAPSULE ORAL at 09:53

## 2018-03-03 RX ADMIN — POTASSIUM CHLORIDE SCH MEQ: 1500 TABLET, EXTENDED RELEASE ORAL at 20:56

## 2018-03-03 RX ADMIN — VANCOMYCIN HYDROCHLORIDE SCH MG: 125 CAPSULE ORAL at 20:43

## 2018-03-03 RX ADMIN — METOPROLOL SUCCINATE SCH MG: 50 TABLET, EXTENDED RELEASE ORAL at 09:52

## 2018-03-03 RX ADMIN — Medication SCH CAP: at 09:53

## 2018-03-03 RX ADMIN — NYSTATIN SCH APPLIC: 100000 POWDER TOPICAL at 20:55

## 2018-03-03 RX ADMIN — DABIGATRAN ETEXILATE MESYLATE SCH MG: 150 CAPSULE ORAL at 09:53

## 2018-03-03 RX ADMIN — POTASSIUM CHLORIDE SCH MEQ: 1500 TABLET, EXTENDED RELEASE ORAL at 09:53

## 2018-03-03 RX ADMIN — Medication SCH CAP: at 20:42

## 2018-03-03 RX ADMIN — VANCOMYCIN HYDROCHLORIDE SCH MG: 125 CAPSULE ORAL at 14:00

## 2018-03-03 RX ADMIN — ATORVASTATIN CALCIUM SCH MG: 80 TABLET, FILM COATED ORAL at 16:42

## 2018-03-03 NOTE — PN
Progress Note


Date of Service: 03/03/18


Note: 


NUNU PRYOR was visited. Nursing and therapy notes read and reviewed.  Her 

potassium and magnesium were low and she received supplements last night.  No 

chest pain, shortness of breath or abdominal pain.  She had 3 incontinent 

stools yesterday per nursing. She does not have much recollection of it.








Current Medications: 


 Active Medications











Generic Name Dose Route Start Last Admin





  Trade Name Freq  PRN Reason Stop Dose Admin


 


Acetaminophen  650 mg  02/20/18 13:28  





  Tylenol Tab*  PO   





  Q6H PRN   





  FEVER/PAIN   


 


Amlodipine Besylate  5 mg  02/24/18 21:00  03/02/18 20:53





  Norvasc Tab*  PO   5 mg





  BEDTIME IGOR   Administration


 


Atorvastatin Calcium  80 mg  02/21/18 17:00  03/02/18 17:14





  Lipitor*  PO   80 mg





  1700 IGOR   Administration


 


Dabigatran  150 mg  02/20/18 21:00  03/02/18 20:53





  Pradaxa Cap(Nf)  PO   150 mg





  BID IGOR   Administration


 


Docusate Sodium  100 mg  02/24/18 09:22  





  Colace Cap*  PO   





  BID PRN   





  CONSTIPATION   


 


Fluoxetine HCl  20 mg  02/26/18 17:54  03/02/18 09:43





  Prozac Cap*  PO   20 mg





  DAILY IGOR   Administration


 


Hydralazine HCl  5 mg  02/24/18 16:08  02/24/18 16:33





  Apresoline Tab*  PO   5 mg





  Q6H PRN   Administration





  SBP >180   


 


Lactobacillus Rhamnosus  1 cap  02/22/18 21:00  03/02/18 20:53





  Culturelle*  PO   1 cap





  BID IOGR   Administration


 


Losartan Potassium  100 mg  02/21/18 09:00  03/02/18 09:56





  Cozaar Tab*  PO   100 mg





  DAILY IGOR   Administration


 


Magnesium Hydroxide  30 ml  02/20/18 13:28  





  Milk Of Magnesia Liq*  PO   





  Q6H PRN   





  CONSTIPATION   


 


Magnesium Oxide  800 mg  03/03/18 09:00  





  Magox 400 Tab*  PO   





  DAILY IGOR   


 


Metoprolol Succinate  75 mg  02/21/18 09:00  03/02/18 09:43





  Toprol Xl Tab*  PO   75 mg





  DAILY IGOR   Administration


 


Nystatin  1 applic  02/22/18 21:00  03/02/18 21:46





  Nystatin Top Powder*  TOPICAL   1 applic





  BID IGOR   Administration


 


Potassium Chloride  20 meq  03/02/18 21:00  03/02/18 20:53





  Klor Con Er Tab*  PO   20 meq





  BID IGOR   Administration


 


Senna  2 tab  02/20/18 13:28  





  Senokot Tab*  PO   





  BEDTIME PRN   





  CONSTIPATION   


 


Vancomycin HCl  125 mg  03/01/18 14:00  03/02/18 20:53





  Vancomycin Cap*  PO  03/06/18 23:55  125 mg





  TID IGOR   Administration











Vital Signs: 


 Vital Signs











Temp Pulse Resp BP Pulse Ox


 


 98.4 F   58   24   164/55   92 


 


 03/03/18 05:56  03/03/18 05:56  03/03/18 05:56  03/03/18 05:56  03/03/18 05:56











Lab Results: 


 Laboratory Results - last 24 hr











  03/02/18 03/02/18 03/02/18





  14:06 14:06 14:06


 


WBC  10.8  


 


RBC  4.97  


 


Hgb  14.3  


 


Hct  43  


 


MCV  86  


 


MCH  29  


 


MCHC  33  


 


RDW  14  


 


Plt Count  375  


 


MPV  9  


 


Neut % (Auto)  66.8  


 


Lymph % (Auto)  24.6 L  


 


Mono % (Auto)  6.4  


 


Eos % (Auto)  0.8  


 


Baso % (Auto)  1.4  


 


Absolute Neuts (auto)  7.2  


 


Absolute Lymphs (auto)  2.7  


 


Absolute Monos (auto)  0.7  


 


Absolute Eos (auto)  0.1  


 


Absolute Basos (auto)  0.2  


 


Absolute Nucleated RBC  0  


 


Nucleated RBC %  0.1  


 


Sodium   134 


 


Potassium   3.1 L 


 


Chloride   99 L 


 


Carbon Dioxide   27 


 


Anion Gap   8 


 


BUN   25 H 


 


Creatinine   0.83 


 


Est GFR ( Amer)   85.3 


 


Est GFR (Non-Af Amer)   66.3 


 


BUN/Creatinine Ratio   30.1 H 


 


Glucose   176 H 


 


Hemoglobin A1c    6.2 H


 


Calcium   8.9 


 


Magnesium   1.5 L 














  03/03/18





  05:42


 


WBC 


 


RBC 


 


Hgb 


 


Hct 


 


MCV 


 


MCH 


 


MCHC 


 


RDW 


 


Plt Count 


 


MPV 


 


Neut % (Auto) 


 


Lymph % (Auto) 


 


Mono % (Auto) 


 


Eos % (Auto) 


 


Baso % (Auto) 


 


Absolute Neuts (auto) 


 


Absolute Lymphs (auto) 


 


Absolute Monos (auto) 


 


Absolute Eos (auto) 


 


Absolute Basos (auto) 


 


Absolute Nucleated RBC 


 


Nucleated RBC % 


 


Sodium  135


 


Potassium  3.7


 


Chloride  103


 


Carbon Dioxide  27


 


Anion Gap  5


 


BUN  20


 


Creatinine  0.69


 


Est GFR ( Amer)  105.5


 


Est GFR (Non-Af Amer)  82.1


 


BUN/Creatinine Ratio  29.0 H


 


Glucose  97


 


Hemoglobin A1c 


 


Calcium  8.8


 


Magnesium  1.6 L











Exam: 





GEN: No acute distress. Alert and appropriate.


HEENT: L facial droop. Left gaze preference.


LUNGS: Clear bilaterally


HEART: Regular rate and rhythm


ABDOMEN: Soft, + BS, non-tender, non-distended


NEUROLOGIC: CN VII palsy. trace right shoulder shrug and movement of her right 

hip.


EXTREMITIES: No edema.


Assessment/Plan: 


78yo woman s/p CVA with right hemiplegia, dysphagia, dysarthria, and cognitive 

impairment.





1. Left CVA, right hemiplegia: PT/OT/SLP. Pradaxa/Lipitor. Prozac to 20 mg.


2. Dysphagia: Pureed Texture, nectar thick liquids


3. Right heel ulcer: mepilex dressings.  Multipodus boot with hourly checks and 

adjustments scheduled.


4. P. Atrial Fibrillation: Pradaxa/Lopressor


5. C. Diff: Oral Vanco and on Culturelle BID. Diarrhea has slowed down off 

cipro but still some loose incontinent stools. Dr. Salomon recommends keeping 

oral Vanco going a few more days and monitor BMs off Cipro


6. Hypertension: Cozaar and Lopressor.  Amlodipine 5mg qhs added on 2/24/18, 

with prn hydralazine.


7. Hyperglycemia: Hgb A1c mildly elevated.  I will change to consistent 

carbohydrate diet, although in speaking with her it does not seem as though she 

takes in many carbs.  Start very low dose metformin 250mg qday tomorrow.  Check 

fingersticks bid.  I explained to her how hyperglycemia and diabetes mellitus 

can increase risk for stroke.  She may need to see diabetes educator prior to d/

c. I will ask dietary to see her.


8. Hypokalemia/hypomagnesemia: Continue KCL supplement today, then d/c since 

potassium is better today.  Continue Magnesium oxide and recheck Mg with next 

labs.


9.  Leukocytosis: resolved.


10. Advanced Directives: Has MOLST; DNR








03/03/18 09:53

## 2018-03-04 RX ADMIN — NYSTATIN SCH APPLIC: 100000 POWDER TOPICAL at 09:33

## 2018-03-04 RX ADMIN — NYSTATIN SCH APPLIC: 100000 POWDER TOPICAL at 19:58

## 2018-03-04 RX ADMIN — LOSARTAN POTASSIUM SCH MG: 25 TABLET, FILM COATED ORAL at 08:49

## 2018-03-04 RX ADMIN — ATORVASTATIN CALCIUM SCH MG: 80 TABLET, FILM COATED ORAL at 17:14

## 2018-03-04 RX ADMIN — VANCOMYCIN HYDROCHLORIDE SCH MG: 125 CAPSULE ORAL at 08:49

## 2018-03-04 RX ADMIN — METOPROLOL SUCCINATE SCH MG: 50 TABLET, EXTENDED RELEASE ORAL at 08:38

## 2018-03-04 RX ADMIN — MAGNESIUM OXIDE TAB 400 MG (241.3 MG ELEMENTAL MG) SCH MG: 400 (241.3 MG) TAB at 08:49

## 2018-03-04 RX ADMIN — DABIGATRAN ETEXILATE MESYLATE SCH MG: 150 CAPSULE ORAL at 08:49

## 2018-03-04 RX ADMIN — VANCOMYCIN HYDROCHLORIDE SCH MG: 125 CAPSULE ORAL at 19:58

## 2018-03-04 RX ADMIN — METFORMIN HYDROCHLORIDE SCH MG: 500 TABLET, FILM COATED ORAL at 08:38

## 2018-03-04 RX ADMIN — Medication SCH CAP: at 08:49

## 2018-03-04 RX ADMIN — AMLODIPINE BESYLATE SCH MG: 5 TABLET ORAL at 19:58

## 2018-03-04 RX ADMIN — FLUOXETINE SCH MG: 20 CAPSULE ORAL at 08:49

## 2018-03-04 RX ADMIN — Medication SCH CAP: at 19:51

## 2018-03-04 RX ADMIN — DABIGATRAN ETEXILATE MESYLATE SCH MG: 150 CAPSULE ORAL at 19:50

## 2018-03-04 RX ADMIN — VANCOMYCIN HYDROCHLORIDE SCH MG: 125 CAPSULE ORAL at 13:42

## 2018-03-04 NOTE — PN
Progress Note


Date of Service: 03/04/18


Note: 


NUNU PRYOR was visited. Nursing notes read and reviewed. No chest pain, 

shortness of breath or abdominal pain.  She does not like getting the 

fingersticks for glucose monitoring that started yesterday.  Also, she wants to 

get off the pureed diet. She wants more meats but only the beef has flavor.  

She had liquid stool x4 yesterday.








Current Medications: 


 Active Medications











Generic Name Dose Route Start Last Admin





  Trade Name Freq  PRN Reason Stop Dose Admin


 


Acetaminophen  650 mg  02/20/18 13:28  





  Tylenol Tab*  PO   





  Q6H PRN   





  FEVER/PAIN   


 


Amlodipine Besylate  5 mg  02/24/18 21:00  03/03/18 20:56





  Norvasc Tab*  PO   5 mg





  BEDTIME IGOR   Administration


 


Atorvastatin Calcium  80 mg  02/21/18 17:00  03/03/18 16:42





  Lipitor*  PO   80 mg





  1700 IGOR   Administration


 


Dabigatran  150 mg  02/20/18 21:00  03/04/18 08:49





  Pradaxa Cap(Nf)  PO   150 mg





  BID IGOR   Administration


 


Docusate Sodium  100 mg  02/24/18 09:22  





  Colace Cap*  PO   





  BID PRN   





  CONSTIPATION   


 


Fluoxetine HCl  20 mg  02/26/18 17:54  03/04/18 08:49





  Prozac Cap*  PO   20 mg





  DAILY IGOR   Administration


 


Hydralazine HCl  5 mg  02/24/18 16:08  02/24/18 16:33





  Apresoline Tab*  PO   5 mg





  Q6H PRN   Administration





  SBP >180   


 


Lactobacillus Rhamnosus  1 cap  02/22/18 21:00  03/04/18 08:49





  Culturelle*  PO   1 cap





  BID IGOR   Administration


 


Losartan Potassium  100 mg  02/21/18 09:00  03/04/18 08:49





  Cozaar Tab*  PO   100 mg





  DAILY IGOR   Administration


 


Magnesium Hydroxide  30 ml  02/20/18 13:28  





  Milk Of Magnesia Liq*  PO   





  Q6H PRN   





  CONSTIPATION   


 


Magnesium Oxide  800 mg  03/03/18 09:00  03/04/18 08:49





  Magox 400 Tab*  PO   800 mg





  DAILY IGOR   Administration


 


Metformin HCl  250 mg  03/04/18 09:00  03/04/18 08:38





  Glucophage*  PO   250 mg





  DAILY IGOR   Administration


 


Metoprolol Succinate  75 mg  02/21/18 09:00  03/04/18 08:38





  Toprol Xl Tab*  PO   75 mg





  DAILY IGOR   Administration


 


Nystatin  1 applic  02/22/18 21:00  03/04/18 09:33





  Nystatin Top Powder*  TOPICAL   1 applic





  BID IGOR   Administration


 


Senna  2 tab  02/20/18 13:28  





  Senokot Tab*  PO   





  BEDTIME PRN   





  CONSTIPATION   


 


Vancomycin HCl  125 mg  03/01/18 14:00  03/04/18 08:49





  Vancomycin Cap*  PO  03/06/18 23:55  125 mg





  TID IGOR   Administration











Vital Signs: 


 Vital Signs











Temp Pulse Resp BP Pulse Ox


 


 97.6 F   56   18   165/75   90 


 


 03/04/18 06:18  03/04/18 06:18  03/04/18 06:18  03/04/18 06:18  03/04/18 06:18











Lab Results: 


 Laboratory Results - last 24 hr











  03/03/18 03/04/18





  16:21 07:40


 


POC Glucose (mg/dL)  109 H  108 H











Exam: 





GEN: No acute distress. Alert and appropriate.


HEENT: L facial droop.


LUNGS: Clear bilaterally


HEART: Regular rate and rhythm


ABDOMEN: Soft, + BS, non-tender, non-distended


NEUROLOGIC: CN VII palsy. trace right shoulder shrug and movement of her right 

hip and 2nd toe today.


EXTREMITIES: No edema.


Assessment/Plan: 


78yo woman s/p CVA with right hemiplegia, dysphagia, dysarthria, and cognitive 

impairment.





1. Left CVA, right hemiplegia: PT/OT/SLP. Pradaxa/Lipitor. Prozac to 20 mg.


2. Dysphagia: Pureed Texture, nectar thick liquids. I explained diet is 

advanced per speech therapy. I will ask dietician to see about choices of foods.


3. Right heel ulcer: mepilex dressings.  Multipodus boot with hourly checks and 

adjustments scheduled.


4. P. Atrial Fibrillation: Pradaxa/Lopressor


5. C. Diff: Oral Vanco and on Culturelle BID. Incontinent liquid stools x4 

yesterday (did not start metformin until this morning). Possibly not helped by 

oral magnesium oxide needed for supplementation. Dr. Salomon following.


6. Hypertension: Cozaar and Lopressor.  Amlodipine 5mg qhs added on 2/24/18, 

with prn hydralazine. Consider increase amlodipine.


7. Hyperglycemia: Hgb A1c mildly elevated.  Consistent carbohydrate diet, low 

dose metformin 250mg qday starts today.  Check fingersticks bid.  I explained 

to her how hyperglycemia and diabetes mellitus can increase risk for stroke. 

Also, importance of monitoring FS as she initiates the low dose metformin.  If 

she seems stable on it for at least a few days possibly fingersticks can be 

decreased or d/c'd.  She is agreeable. She may need to see diabetes educator 

prior to discharge.


8. Hypokalemia/hypomagnesemia: d/c'd KCL supplement today.  Continue Magnesium 

oxide and recheck Mg on Monday.  Magnesium oxide may contribute to diarrhea.  

Hopefully level will be better and can d/c or decrease dose.


9.  Leukocytosis: resolved.


10. Advanced Directives: Hannah QUIROS; DNR





03/04/18 10:26

## 2018-03-05 RX ADMIN — VANCOMYCIN HYDROCHLORIDE SCH MG: 125 CAPSULE ORAL at 13:47

## 2018-03-05 RX ADMIN — METOPROLOL SUCCINATE SCH MG: 50 TABLET, EXTENDED RELEASE ORAL at 09:35

## 2018-03-05 RX ADMIN — Medication SCH CAP: at 20:10

## 2018-03-05 RX ADMIN — VANCOMYCIN HYDROCHLORIDE SCH MG: 125 CAPSULE ORAL at 09:36

## 2018-03-05 RX ADMIN — NYSTATIN SCH APPLIC: 100000 POWDER TOPICAL at 09:45

## 2018-03-05 RX ADMIN — NYSTATIN SCH APPLIC: 100000 POWDER TOPICAL at 20:15

## 2018-03-05 RX ADMIN — METFORMIN HYDROCHLORIDE SCH MG: 500 TABLET, FILM COATED ORAL at 09:37

## 2018-03-05 RX ADMIN — LOSARTAN POTASSIUM SCH: 25 TABLET, FILM COATED ORAL at 09:45

## 2018-03-05 RX ADMIN — ATORVASTATIN CALCIUM SCH MG: 80 TABLET, FILM COATED ORAL at 16:38

## 2018-03-05 RX ADMIN — MAGNESIUM OXIDE TAB 400 MG (241.3 MG ELEMENTAL MG) SCH MG: 400 (241.3 MG) TAB at 09:36

## 2018-03-05 RX ADMIN — Medication SCH CAP: at 09:36

## 2018-03-05 RX ADMIN — DABIGATRAN ETEXILATE MESYLATE SCH MG: 150 CAPSULE ORAL at 09:35

## 2018-03-05 RX ADMIN — FLUOXETINE SCH MG: 20 CAPSULE ORAL at 09:36

## 2018-03-05 RX ADMIN — DABIGATRAN ETEXILATE MESYLATE SCH MG: 150 CAPSULE ORAL at 20:10

## 2018-03-05 RX ADMIN — VANCOMYCIN HYDROCHLORIDE SCH MG: 125 CAPSULE ORAL at 20:10

## 2018-03-05 NOTE — PN
Progress Note


Date of Service: 03/05/18


Note: 


NUNU PRYOR was visited. Therapy notes read and reviewed. She had an 

episode of hypotension this morning when the nurse was going to give her her 

morning meds. I held her Cozaar. Will d/c Norvasc. Her BP when supine still on 

the high side. Will follow. Has been taking PO. Diarrhea slowing. Held MagOx 

due to diarrhea








Current Medications: 


 Active Medications











Generic Name Dose Route Start Last Admin





  Trade Name Freq  PRN Reason Stop Dose Admin


 


Acetaminophen  650 mg  02/20/18 13:28  





  Tylenol Tab*  PO   





  Q6H PRN   





  FEVER/PAIN   


 


Atorvastatin Calcium  80 mg  02/21/18 17:00  03/05/18 16:38





  Lipitor*  PO   80 mg





  1700 IGOR   Administration


 


Dabigatran  150 mg  02/20/18 21:00  03/05/18 09:35





  Pradaxa Cap(Nf)  PO   150 mg





  BID IGOR   Administration


 


Docusate Sodium  100 mg  02/24/18 09:22  





  Colace Cap*  PO   





  BID PRN   





  CONSTIPATION   


 


Fluoxetine HCl  20 mg  02/26/18 17:54  03/05/18 09:36





  Prozac Cap*  PO   20 mg





  DAILY IGOR   Administration


 


Hydralazine HCl  5 mg  02/24/18 16:08  02/24/18 16:33





  Apresoline Tab*  PO   5 mg





  Q6H PRN   Administration





  SBP >180   


 


Lactobacillus Rhamnosus  1 cap  02/22/18 21:00  03/05/18 09:36





  Culturelle*  PO   1 cap





  BID IGOR   Administration


 


Losartan Potassium  100 mg  02/21/18 09:00  03/05/18 09:45





  Cozaar Tab*  PO   Not Given





  DAILY IGOR   


 


Magnesium Hydroxide  30 ml  02/20/18 13:28  





  Milk Of Magnesia Liq*  PO   





  Q6H PRN   





  CONSTIPATION   


 


Metformin HCl  250 mg  03/04/18 09:00  03/05/18 09:37





  Glucophage*  PO   250 mg





  DAILY IGOR   Administration


 


Metoprolol Succinate  50 mg  03/05/18 11:17  





  Toprol Xl Tab*  PO   





  DAILY IGOR   


 


Nystatin  1 applic  02/22/18 21:00  03/05/18 09:45





  Nystatin Top Powder*  TOPICAL   1 applic





  BID IGOR   Administration


 


Senna  2 tab  02/20/18 13:28  





  Senokot Tab*  PO   





  BEDTIME PRN   





  CONSTIPATION   


 


Vancomycin HCl  125 mg  03/01/18 14:00  03/05/18 13:47





  Vancomycin Cap*  PO  03/06/18 23:55  125 mg





  TID IGOR   Administration











Vital Signs: 


 Vital Signs











Temp Pulse Resp BP Pulse Ox


 


 97.7 F   49   18   140/70   93 


 


 03/05/18 16:29  03/05/18 16:29  03/05/18 16:29  03/05/18 16:29  03/05/18 16:29











Lab Results: 


 Laboratory Results - last 24 hr











  03/05/18





  05:31


 


Sodium  134


 


Potassium  3.7


 


Chloride  101


 


Carbon Dioxide  28


 


Anion Gap  5


 


BUN  16


 


Creatinine  0.74


 


Est GFR ( Amer)  97.4


 


Est GFR (Non-Af Amer)  75.7


 


BUN/Creatinine Ratio  21.6 H


 


Glucose  103 H


 


Calcium  9.4


 


Magnesium  1.5 L











Exam: 





HEENT: L facial droop.


LUNGS: Clear bilaterally


HEART: Regular rate and rhythm


ABDOMEN: Soft, + BS, non-tender, non-distended


NEUROLOGIC: CN VII palsy. No movement on right seen by me this afternoon


EXTREMITIES: No edema.


Assessment/Plan: 





1. Left CVA, right hemiplegia: PT/OT/SLP. Pradaxa/Lipitor. Prozac to 20 mg.


2. Dysphagia: Pureed Texture, nectar thick liquids. 


3. Right heel ulcer: mepilex dressings.  Multipodus boot with hourly checks and 

adjustments scheduled.


4. P. Atrial Fibrillation: Pradaxa/Lopressor


5. C. Diff: Oral Vanco and on Culturelle BID. Finishing up Vanco. Hold MagOx 

for a day or two. Dr. Salomon following.


6. Hypertension: Cozaar and Lopressor.  Low BP whrn up today. D/C  Amlodipine 

5mg qhs, with prn hydralazine. 


7. Hyperglycemia: Hgb A1c mildly elevated.  Consistent carbohydrate diet, low 

dose metformin 250mg qday starts today.  Check fingersticks bid.  Will follow


8. Hypokalemia/hypomagnesemia: d/c'd KCL supplement today.  .


9. Advanced Directives: Has MOLST; DNR











03/05/18 17:06

## 2018-03-06 RX ADMIN — Medication SCH CAP: at 20:57

## 2018-03-06 RX ADMIN — VANCOMYCIN HYDROCHLORIDE SCH MG: 125 CAPSULE ORAL at 13:57

## 2018-03-06 RX ADMIN — METOPROLOL SUCCINATE SCH MG: 50 TABLET, EXTENDED RELEASE ORAL at 09:42

## 2018-03-06 RX ADMIN — FLUOXETINE SCH MG: 20 CAPSULE ORAL at 09:41

## 2018-03-06 RX ADMIN — DABIGATRAN ETEXILATE MESYLATE SCH MG: 150 CAPSULE ORAL at 20:57

## 2018-03-06 RX ADMIN — ATORVASTATIN CALCIUM SCH MG: 80 TABLET, FILM COATED ORAL at 17:17

## 2018-03-06 RX ADMIN — NYSTATIN SCH APPLIC: 100000 POWDER TOPICAL at 09:40

## 2018-03-06 RX ADMIN — DABIGATRAN ETEXILATE MESYLATE SCH MG: 150 CAPSULE ORAL at 09:41

## 2018-03-06 RX ADMIN — VANCOMYCIN HYDROCHLORIDE SCH MG: 125 CAPSULE ORAL at 09:41

## 2018-03-06 RX ADMIN — METFORMIN HYDROCHLORIDE SCH MG: 500 TABLET, FILM COATED ORAL at 09:42

## 2018-03-06 RX ADMIN — LOSARTAN POTASSIUM SCH MG: 25 TABLET, FILM COATED ORAL at 09:41

## 2018-03-06 RX ADMIN — NYSTATIN SCH: 100000 POWDER TOPICAL at 19:22

## 2018-03-06 RX ADMIN — Medication SCH CAP: at 09:41

## 2018-03-06 NOTE — PN
Progress Note


Date of Service: 03/06/18


Note: 


NUNU PRYOR was visited. Therapy notes read and reviewed. Appreciate Dr. Salomon's help. She is now off Vanco. She was discussed in interdisciplinary 

team rounds. Slow gains noted. 








Current Medications: 


 Active Medications











Generic Name Dose Route Start Last Admin





  Trade Name Freq  PRN Reason Stop Dose Admin


 


Acetaminophen  650 mg  02/20/18 13:28  





  Tylenol Tab*  PO   





  Q6H PRN   





  FEVER/PAIN   


 


Atorvastatin Calcium  80 mg  02/21/18 17:00  03/05/18 16:38





  Lipitor*  PO   80 mg





  1700 IGOR   Administration


 


Dabigatran  150 mg  02/20/18 21:00  03/06/18 09:41





  Pradaxa Cap(Nf)  PO   150 mg





  BID IGOR   Administration


 


Docusate Sodium  100 mg  02/24/18 09:22  





  Colace Cap*  PO   





  BID PRN   





  CONSTIPATION   


 


Fluoxetine HCl  20 mg  02/26/18 17:54  03/06/18 09:41





  Prozac Cap*  PO   20 mg





  DAILY IGOR   Administration


 


Hydralazine HCl  5 mg  02/24/18 16:08  02/24/18 16:33





  Apresoline Tab*  PO   5 mg





  Q6H PRN   Administration





  SBP >180   


 


Lactobacillus Rhamnosus  1 cap  02/22/18 21:00  03/06/18 09:41





  Culturelle*  PO   1 cap





  BID IGOR   Administration


 


Losartan Potassium  100 mg  02/21/18 09:00  03/06/18 09:41





  Cozaar Tab*  PO   100 mg





  DAILY IGOR   Administration


 


Magnesium Hydroxide  30 ml  02/20/18 13:28  





  Milk Of Magnesia Liq*  PO   





  Q6H PRN   





  CONSTIPATION   


 


Metformin HCl  250 mg  03/04/18 09:00  03/06/18 09:42





  Glucophage*  PO   250 mg





  DAILY IGOR   Administration


 


Metoprolol Succinate  50 mg  03/05/18 11:17  03/06/18 09:42





  Toprol Xl Tab*  PO   50 mg





  DAILY IGOR   Administration


 


Nystatin  1 applic  02/22/18 21:00  03/06/18 09:40





  Nystatin Top Powder*  TOPICAL   1 applic





  BID IGOR   Administration


 


Senna  2 tab  02/20/18 13:28  





  Senokot Tab*  PO   





  BEDTIME PRN   





  CONSTIPATION   











Vital Signs: 


 Vital Signs











Temp Pulse Resp BP Pulse Ox


 


 97.3 F   55   20   158/80   95 


 


 03/06/18 15:57  03/06/18 15:57  03/06/18 15:57  03/06/18 15:57  03/06/18 15:57











Lab Results: 


 Laboratory Results - last 24 hr











  03/05/18 03/06/18





  22:21 07:30


 


POC Glucose (mg/dL)  133 H  97











Exam: 





HEENT: L facial droop.


LUNGS: Clear bilaterally


HEART: Regular rate and rhythm


ABDOMEN: Soft, + BS, non-tender, non-distended


NEUROLOGIC: CN VII palsy. No movement on right seen by me today


EXTREMITIES: No edema.


Assessment/Plan: 





1. Left CVA, right hemiplegia: PT/OT/SLP. Pradaxa/Lipitor. Prozac 20 mg.


2. Dysphagia: Pureed Texture, nectar thick liquids. 


3. Right heel ulcer: mepilex dressings.  Multipodus boot with hourly checks and 

adjustments scheduled.


4. P. Atrial Fibrillation: Pradaxa/Lopressor


5. C. Diff: Oral Vanco finished and on Culturelle BID. Hold MagOx for a day or 

two. 


6. Hypertension: Cozaar and Lopressor.  Low BP whrn up today. D/C  Amlodipine 

5mg qhs, with prn hydralazine. 


7. Hyperglycemia: Hgb A1c mildly elevated.  Consistent carbohydrate diet, low 

dose metformin 250mg qday.  Check fingersticks bid.  Will follow


8. Hypokalemia/hypomagnesemia: d/c'd KCL supplement today.  .


9. Advanced Directives: Hannah QUIROS; DNR














03/06/18 16:41

## 2018-03-06 NOTE — PN
Progress Note





- Progress Note


Date of Service: 03/06/18


SOAP: 


Subjective:


cc: diarrhea


HPI: 77 year old woman with CVA, at [facility], dx with Cdif there and discharged on 

vancomycin.  She has had 3 soft stools today per RN.  No abd pain, fever, or 

nausea. 








Objective:








 Vital Signs











Temp  36.3 C   03/06/18 15:57


 


Pulse  55   03/06/18 15:57


 


Resp  20   03/06/18 15:57


 


BP  158/80   03/06/18 15:57


 


Pulse Ox  95   03/06/18 15:57








 Intake & Output











 03/05/18 03/06/18 03/06/18





 18:59 06:59 18:59


 


Intake Total 355  410


 


Output Total  1 1


 


Balance 355 -1 409


 


Intake:   


 


  Oral 355  410


 


Output:   


 


  # Incontinent Voids  1 1


 


Other:   


 


  Estimated Void Small Large Large


 


  # Bowel Movements 1  


 


  Estimated Stool Amount Medium  


 


  # Voids 1  








Gen:awake, no distress


HEENT:no thrush


Heart:RRR no murmur


Lungs:CTA BL


Abd:+BS NTND soft


Skin: no rash


 Laboratory Results - last 24 hr











  03/05/18 03/06/18





  22:21 07:30


 


POC Glucose (mg/dL)  133 H  97











Assessment:


1. diarrhea; improving.  Had 3 weeks vancomycin.  I do not think she has Cdif 

infection any longer.


2. CVA








Plan:


1. DC vancomycin, please call if increased frequency of stools after finishing 

vancomycin

## 2018-03-06 NOTE — PMRUTEAM
PMRU: Goals


Current Status: 


 Nursing: Current Status











Skin Deviations [Right Lower   Abrasion





Leg]                           


 


Skin Deviations [Buttocks]     Other


 


Skin Deviations [Right Heel]   Pressure Ulcer


 


Skin Deviations [Left Heel]    Pressure Ulcer


 


Skin Deviation Description [   healing





Right Lower Leg]               


 


Skin Deviation Description [   healed no redness





Buttocks]                      


 


Skin Deviation Description [   multipodus boot continuously on and mepilex





Right Heel]                    


 


Skin Deviation Description [   none





Left Heel]                     


 


Wound Stage [Right Heel]       II











 Physical Therapy: Current Status











Bed Mobility Assistance        Mod Assist


 


Transfer Moblility Assistance  Mod Assist


 


Transfer/Bed Mobility          None





Recommended Devices            


 


Transfer Mobility Comment      mod to max A x 1 SPT


 


Ambulation Assistance          Unable


 


Ambulation Assistive Devices   Joey Walker


 


Ambulation Comment             Pt. is able to transition from sit to stand min 

to





 mod A x 1.


 


Stairs Assistance              Not Tested


 


Curb                           Not Tested














 Occupational Therapy: Current Status











Upper Body Dressing            Min Assist


 


Upper Body Dressing Progress   max v/c's for hemidressing, initial assist for





 initiation RUE


 


Lower Body Dressing            Mod Assist,Max Asst,2 Person Assist


 


Lower Body Dressing Progress   pt able to thread LLE, modA for left sock, 2





 person in standing to hike


 


Bathing                        Mod Assist,2 Person Assist


 


Bathing Progress               2 person assist in standing, assist for LUE and





 simona lower legs/feet


 


Toileting                      Total Assist,2 Person Assist


 


Toileting Progress             1 person assist for standing balance,assist of





 another for clothing/hygiene


 


Toilet Transfer                Mod Assist,Max Asst


 


Toilet Transfer Progress       SPT with hemisling/gait belt, blocking right knee

,





 mod v/c's


 


Shower Transfer                Total Assist


 


Shower Transfer Progress       roll in shower chair


 


Eating                         Supervision


 


Eating Progress                setupA to open containers, thicken liquids














 Rec Therapy: Current Status











Summary of Assessment and      RT assessment completed .





Clinical Impression            


 


Treatment Goals                Patient will engage in recreation and leisure





 activities while on the unit.


 


Treatment Plan                 Will follow up with patient regularly for leisure





 visits and provide recreational activities as





 appropriate.














 Social Work: Current Status











Discharge Plan                 return home with home care svs and family support


 


Potential for Family Training  pt's daughter and granddaughter are involved and





 attentive


 


Anticipated Discharge          Home





Destination                    


 


Discharge With                 home care svs and family support














 Nutrition: Current Status











Monitoring                     pt visited on 3/4 for diabetes education;





 metformin started 3/4.  Consistent carb diet added





 3/3. She remains on pureed textures, but working





 w/SLP and hoping to upgrade diet texture and





 liquid consistencies soon. Will follow SLP notes





 and recommednations.  Pt agreeable to try Glucerna





 Shake (vs milkshake) given new dx DM2.  Serum K 3





 .7 (3/5).  BM 3/3 and 3/5.











 Speech: Current Status











Assessment                     Patient demonstrated reading and problem solving





 at a similar level as previous session, improved





 over sessions before that.





 Pt demonstrated continued reduced sensation in R





 side oral cavity with poor awareness of residue of





 soft solid.





 Patient tolerated all trials of thin liquids w/ no





 clinical s/s aspiration.





 Consider videoluoroscopic swallow evaluation to





 fully assess oropharyngela swallow function, guide





 treatment approach, and r/o aspiration for





 possible upgrade to thin liquids.





 














Goals: 


 Physical Therapy: Initial Goals











Bed Mobility Assistance        Independent


 


Transfer Mobility Assistance   Independent


 


Transfer/Bed Mobility          Joey Walker





Recommended Devices            


 


Ambulation                     Independent


 


Ambulation Recommended Devices Joey Walker


 


Ambulation Distance            50


 


Stairs Assistance              Independent


 


Stair Recommended Devices      One Rail


 


Number of Stairs               5














 Physical Therapy: Updated Goals











Bed Mobility Assistance        Independent


 


Transfer Mobility Assistance   Independent


 


Transfer/Bed Mobility          Joey Walker





Recommended Devices            


 


Ambulation Assistance          Independent


 


Ambulation Assistive Devices   Joey Walker


 


Stairs Assistance              Independent


 


Stairs Recommended Devices     One Rail


 


Number of Stairs               5














 Occupational Therapy: Initial Goals











Goals to be Completed in (Days 4-6 weeks





)                              


 


Upper Body Bathing Routine     Modified Independent with


 


Lower Body Bathing Routine     Modified Independent with


 


Upper Body Dressing Routine    Modified Independent with


 


Lower Body Dressing Routine    Modified Independent with


 


Toilet Hygeine and Clothing    Modified Independent with





Management Routine             


 


Toilet Transfer Routine        Modified Independent with


 


Step-In Shower Transfer        Modified Independent with





Routine                        


 


Tub Transfer Routine           Modified Independent with


 


Functional Transfers for ADL   Modified Independent with


 


Grooming Routine               Modified Independent with


 


Feeding Routine                Modified Independent with














 Nutrition: Goals











Intervention Goals             1. pt will tolerate least-restrictive diet 

texture





 and liquid consistency without s/sx aspiration





 2. adequate po intake to maintain lean body mass





 and hydration; no clinical s/sx dehydration





 3. bowel pattern will be regulated; no c/o





 diarrhea (or constipation)





 4. Skin will show signs of healing without further





 breakdown





 5. achieve and maintain serum K within





 satisfactory range





 6. BG will be adequately controlled per inpt





 parameters





 7. Any further instruction will be provided prior





 to d/c











 Speech: Goals











Speech Goal 1                  Swallowing


 


Goal 1 Comments                Long-Term Goal: Pt will tolerate least 

restrictive





 diet consistencies with no clinical s/s or





 complications from aspiration.





 Short-Term Goals





 1) STG: Pt will tolerate pureed consistency and





 nectar liquids with no clinical s/s or





 complications from aspiration and adequate PO





 intake.





 Status: Met, with modification to compensate for





 reduce intake of nectar liquids: Allow thin water





 between meals, after oral care, with no other food





 or drink. Patient has advanced from original





 precaution of chin tuck with Left head turn, to





 chin tuck at median and hard-fast "effortful"





 swallows.





 2) STG: Pt will will demonstrate adequate





 mastication of soft solids, using compensatory





 strategies to clear oral residue from R side of





 oral cavity, given minimal cueing.





 Patient c/o "tired of" limited variety of pureed





 foods. Pt demonstrated adequate bolus preparation





 on L side of oral cavity ad continues w/ reduced





 sensation in R side oral cavity with poor





 awareness of residue. Pt requires further skilled





 instruction to use tongue sweep to clear R-side





 oral residue.





 3) STG: Pt will tolerate thin liquids at meals





 with no clinical s/s or complications from





 aspiration and adequate PO intake.





 Patient demonstrated ability to perform chin tuck





 facing midline with effortful swallow technique I'





 ly. For skilled trials of thin iced tea with lunch





 , pt I'ly demonstrated chin tuck and tolerated





 well w/ no clinical s/s aspiration.





 Plan: Recommend videofluoroscopic swallow





 evaluation to fully assess swallow function, r/o





 aspiration of thin liquids, and Rx further





 treatment.





 





 


 


Speech Goal 2                  Problem Solving


 


Speech Goal 2 Comments         Long-Term Goals:





 1) LTG: Pt will solve functional daily problems





 independently using compensatory strategies as





 needed. Status:  Ongoing.





 2) LTG: Pt will read and write basic functional





 information, using non-dominant hand and





 compensatory strategies independently.





 Short-Term Goals:





 1) STG:  Pt will demonstrate use of compensatory





 strategies to solve simple memory, feeding and





 self-care problems, including attention to the





 Rght side of her work area and tasks.





 Status 1:  Ongoing. Given initial instruction, pt





 has attended to letters and pictures on the Right





 side of worksheets with minim cueing.





 2) STG:  Pt will read simple 1-2 step directions,





 using compensatory strategies, with 80% accuracy,





 given Minimal cueing. Status:  Ongoing. Given





 moderate verbal cueing, patient read and followed





 written 1-2 step directions, with use of Left





 index finger to point to sequences of words and





 pictures.





 3) STG: Pt will draw basic shapes and write





 letters, using nondominant Left hand and





 compensatory strategies. Status 3:  Ongoing. When





 copying words, patient substituted unrelated





 letters without self-correction. Patient required





 maximum visual demonstration and direct models to





 copy single letter forms accurately.














 Social Work: Goals











Discharge Plan                 return home with home care svs and family support


 


Potential for Family Training  pt's daughter and granddaughter are involved and





 attentive


 


Anticipated Discharge          Home





Destination                    


 


Discharge With                 home care svs and family support

















Care Plan: 


 Care Plan





ADL's - Improve/Maintain                Start:  18 15:29              


Freq:   QSHIFT                          Status: Active      Target:         


Protocol:                                                                   








Activity Type Activity Date Activity User E-Sign Co-Sign Detail





Recorded Client Recorded Date Recorded By   


 


Document 18 10:49 [id number]   





PMRU-C09 18 10:49 [id number]   














  18





  10:49


 


PMRU Outcome: ADL's/ADL Transfers 


 


Orders/Interventions Occupational





 Therapy





 Evaluation &





 Treatment





 Communication





 Tool in Patient





 Room


 


Address Deficits Secondary To: [place]


 


Patient to receive OT 5x/wk for  Therex





min/day Self Care





 Management





 Group Therapy





 Neuromuscular





 ReEducation


 


UE/LE ADL's with Assist Yes: Iraj


 


ADL Transfers with Assist Yes: Iraj


 


Toileting: Transfers,Clothing Management Yes: Iraj





,Hygeine w/Assist 


 


Light Kitchen/Laundry w/Assist Yes: Bernarda


 


Progression Toward Outcome/Goals Progressing


 


Outcome/Goals Met Pt participated





 well,





 increased





 independence





 with UE/LE





 dressing this





 date with





 increased





 cueing when





 attempting





 right side,





 increased





 difficulty with





 motor planning





 observed on





 right hemibody





 compared to





 left during





 session. Pt





 with decreased





 difficulty





 looking to





 target on right





 with visual





 scanning this





 date. Pt will





 benefit from





 continued





 skilled OT





 intervention to





 maximize





 independence





 and safety.








Communication-Improve/Maintain          Start:  18 02:06              


Freq:   QSHIFT                          Status: Active      Target:         


Protocol:                                                                   








Activity Type Activity Date Activity User E-Sign Co-Sign Detail





Recorded Client Recorded Date Recorded By   


 


Document 18 10:21 [id number]   





SPEECH-C04 18 10:23 [id number]   














  18





  10:21


 


PMRU Outcome: Communication/Cognitive 





Status 


 


Outcome/Goals Use Comm Tools/





 Devices





 Makes Needs





 Known





 Effectively


 


Other Outcomes/Goals Long-Term Goals





 , Problem





 Solvin) LTG: Pt will





 solve





 functional





 daily problems





 independently





 using





 compensatory





 strategies





 as needed.





 Status:





 Ongoing.





 2) LTG: Pt will





 read and write





 basic





 functional





 information,





 using non-





 dominant hand





 and





 compensatory





 strategies





 independently.





 Short-Term





 Goals:





 1) STG:  Pt





 will





 demonstrate use





 of





 compensatory





 strategies to





 solve simple





 memory, feeding





 and self-care





 problems,





 including





 attention to





 the Rght side





 of her work





 area and tasks.





 2) STG:  Pt





 will read





 simple 1-2 step





 directions,





 using





 compensatory





 strategies,





 with 80%





 accuracy, given





 Minimal cueing





 .





 3) STG: Pt will





 draw basic





 shapes and





 write letters,





 using





 nondominant





 Left hand and





 compensatory





 strategies.


 


Progression Toward Outcomes/Goals Goals Adjusted


 


Outcome/Goals Met Comment Ongoing








Coping/Psych-Improve/Maintain           Start:  18 02:06              


Freq:   QSHIFT                          Status: Active      Target:         


Protocol:                                                                   








Activity Type Activity Date Activity User E-Sign Co-Sign Detail





Recorded Client Recorded Date Recorded By   


 


Document 18 01:08 [id number]   





PMRU-C07 18 01:12 [id number]





  01:08


 


PMRU Outcome: Coping/Psychosocial 


 


Coping Outcome/Goals Verbalization





 of Acceptance





 of Rehab Admit





 Verbalization





 of Sense of





 Control Over





 Health Status





 Utilization of





 Appropriate





 Problem Solving





 Techniques





 Willingness to





 Participate in





 Treatment Plan





 and Basic Needs





 Utilization of





 Available





 Support Systems





 Absence of





 Destructive





 Behavior to





 Self/Others


 


Psychosocial Outcome/Goals Maintain/





 Improve





 Emotional





 Health


 


Progression Toward Outcome/Goals - Progressing





Coping 


 


Progression Toward Outcome/Goals - Progressing





Psychosocial 








Discharge Planning - Improve/Maintain   Start:  18 02:06              


Freq:   QSHIFT                          Status: Active      Target:         


Protocol:                                                                   








Activity Type Activity Date Activity User E-Sign Co-Sign Detail





Recorded Client Recorded Date Recorded By   


 


Document 18 22:22 [id number]   





PMRU-C07 18 22:23 [id number]





  22:22


 


PMRU Outcome: Discharge Planning 


 


Identify Patient Needs yes


 


Update Patient Family No


 


Outcome/Goals Demonstrates





 Understanding





 of Discharge





 Plan


 


Progression Toward Outcome/Goals Progressing








Education-Improve/Maintain              Start:  18 02:06              


Freq:   QSHIFT                          Status: Active      Target:         


Protocol:                                                                   








Activity Type Activity Date Activity User E-Sign Co-Sign Detail





Recorded Client Recorded Date Recorded By   


 


Document 18 01:08 [id number]   





PMRU-C07 18 01:12 [id number]





  01:08


 


PMRU Outcome: Education 


 


Outcome/Goals Demonstrate/





 Verbalize





 Understanding





 of Written





 Discharge





 Instructions





 Demonstrates





 Skills





 Encourage





 Questions


 


Progression Toward Outcome/Goals Progressing








/GI-Improve/Maintain                  Start:  18 02:06              


Freq:   QSHIFT                          Status: Active      Target:         


Protocol:                                                                   








Activity Type Activity Date Activity User E-Sign Co-Sign Detail





Recorded Client Recorded Date Recorded By   


 


Document 18 01:08 [id number]   





PMRU-C07 18 01:12 [id number]





  01:08


 


PMRU Outcome: Genitourinary/ 





Gastrointestinal 


 


Genitourinary- Outcome/Goals Maintain/





 Achieve





 Adequate





 Urinary Output





 Remain Free of





 Hospital-





 Acquired UTI


 


Gastrointestinal-Outcome/Goals Maintain/





 Achieve Bowel





 Regularity in





 Accordance with





 Pt's Baseline





 Prevent





 Constipation





 Laxatives as





 Ordered


 


Progression Toward Outcome/Goals -  Not Progressing


 


Progression Toward Outcome/Goals - GI Not Progressing








Medication Administration               Start:  18 02:06              


Freq:   QSHIFT                          Status: Active      Target:         


Protocol:                                                                   








Activity Type Activity Date Activity User E-Sign Co-Sign Detail





Recorded Client Recorded Date Recorded By   


 


Document 18 01:08 [id number]   





PMRU-C07 18 01:12 [id number]





  01:08


 


PMRU Outcome: Medication Administration 


 


Assess Patient Knowledge/Teach Med Yes





Education for all Meds 


 


Outcome/Goals Family/





 Caregiver





 Administer





 Medications at





 Home





 Demonstrates





 Understanding


 


Progression Towards Outcome/Goals Progressing


 


Is Patient Going Home on Lovenox? No








Mobility- Improve/Maintain              Start:  18 18:22              


Freq:   QSHIFT                          Status: Active      Target:         


Protocol:                                                                   








Activity Type Activity Date Activity User E-Sign Co-Sign Detail





Recorded Client Recorded Date Recorded By   


 


Document 18 17:54 [id number]   





PMRU-C08 18 17:54 [id number]   














  18





  17:54


 


PMRU Outcome: Mobility 


 


Physical Therapy Evaluation and Yes





Treatment 


 


Activity OOB with Assistance Yes


 


Device Yes


 


Assistance Yes


 


Patient to be seen 5x/wk for  min/ Therex





day for: Mobility





 Training





 Gait Training





 W/C Mobility





 Balance


 


Outcome/Goals Maintain/





 Achieve





 Baseline





 Mobility Status





 Improve





 Mobility Status





 Demonstrates





 Proper Use of





 Assistive





 Devices





 Free from





 Complications





 of Immobility


 


Progression Toward Outcome/Goals Progressing


 


Bed Mobility Yes:





 independent


 


Transfers Yes:





 independnet





 with joey





 walker


 


Gait x ft Yes:





 independent





 with joey





 walker to





 household





 distances (50')


 


Up/Down Stairs Yes:





 independent





 with 1 rail up/





 down 5








Neurological- Improve/Maintain          Start:  18 02:06              


Freq:   QSHIFT                          Status: Active      Target:         


Protocol:                                                                   








Activity Type Activity Date Activity User E-Sign Co-Sign Detail





Recorded Client Recorded Date Recorded By   


 


Document 18 01:08 [id number]   





PMRU-C07 18 01:12 [id number]   














  18





  01:08


 


PMRU Outcome: Neurological 


 


Weakness/Aphasia Weakness





 Right Side


 


Outcome/Goals Improve





 Neurological





 Status





 Prevent





 Avoidable





 Neurological





 Decline


 


Progression Toward Outcome/Goals Progressing








Nutrition/Swallowing- Improve/Maintain  Start:  18 02:06              


Freq:   QSHIFT                          Status: Active      Target:         


Protocol:                                                                   








Activity Type Activity Date Activity User E-Sign Co-Sign Detail





Recorded Client Recorded Date Recorded By   


 


Document 18 10:25 [id number]   





SPEECH-C04 18 10:27 [id number]   














  18





  10:25


 


PMRU Outcome: Nutrition/Swallowing 


 


Outcome/Goals Demonstrates





 Adequate





 Hydration/





 Prevents





 Dehydration





 Maintain/





 Improve





 Nutritional





 Status


 


Other Outcome/Goals Problem:





 Swallowing





 Long-Term Goal:





 Pt will





 tolerate least





 restrictive





 diet





 consistencies





 with no





 clinical s/s or





 complications





 from aspiration





 .





 Short-Term





 Goals





 1) STG: Pt will





 tolerate





 pureed





 consistency and





 nectar liquids





 with no





 clinical s/s or





 complications





 from aspiration





 and adequate





 PO intake.





 Status: Met.





 2) STG: Pt will





 will





 demonstrate





 adequate





 mastication of





 soft solids,





 using





 compensatory





 strategies to





 clear oral





 residue from R





 side of oral





 cavity, given





 minimal cueing.





 3) STG: Pt will





 tolerate thin





 liquids at





 meals with no





 clinical s/s or





 complications





 from aspiration





 and adequate





 PO intake.


 


Progression Toward Outcome/Goals Progressing


 


Outcome/Goals Met Demonstrates





 Adequate





 Hydration/





 Prevents





 Dehydration


 


Outcome/Goals Met Comment STG 1 Status:





 Met, with





 modification to





 compensate for





 reduce intake





 of nectar





 liquids: Allow





 thin water





 between meals,





 after oral care





 , with no other





 food or drink.





 Patient has





 advanced from





 original





 precaution of





 chin tuck with





 Left head turn,





 to chin tuck





 at median and





 hard-fast "





 effortful"





 swallows.








Safety- Improve/Maintain                Start:  18 02:06              


Freq:   QSHIFT                          Status: Active      Target:         


Protocol:                                                                   








Activity Type Activity Date Activity User E-Sign Co-Sign Detail





Recorded Client Recorded Date Recorded By   


 


Document 18 01:08 [id number]   





PMRU-C07 18 01:12 [id number]   














  18





  01:08


 


PMRU Outcome: Safety 


 


Outcome/Goals Remain Free of





 Injury or Harm





 Cooperates with





 Safety





 Measures for





 Least





 Restrictive





 Environment





 Prevent Falls/





 Injury


 


Progression Toward Outcome/Goals Progressing


 


Outcome/Goals Met Comment PA in place








Skin- Improve/Maintain                  Start:  18 02:06              


Freq:   QSHIFT                          Status: Active      Target:         


Protocol:                                                                   








Activity Type Activity Date Activity User E-Sign Co-Sign Detail





Recorded Client Recorded Date Recorded By   


 


Document 18 01:08 [id number]   





PMRU-C07 18 01:12 [id number]   














  18





  01:08


 


PMRU Outcome: Skin 


 


Skin Risk Level High


 


Skin Orders Air Mattress





 Spenco Boots





 Multipodus Boot





 Heels Off Bed





 Turn/Position





 q2hr While in





 Bed


 


Outcome/Goals Maintain/





 Improve Skin





 Intergrity





 Maintain/





 Improve Wound





 Status


 


Progression Toward Outcome/Goals Progressing


 


Outcome/Goals Met Comment multipodus boot





 to R foot,





 spanko to L





 foot














Medicine Note: 








Length of Stay:  1 month





Anticipated Discharge Destination: Home





Tentative Discharge Date:  2018





Discharged to:  Home

## 2018-03-07 LAB
BASOPHILS # BLD AUTO: 0.1 10^3/UL (ref 0–0.2)
EOSINOPHIL # BLD AUTO: 0.2 10^3/UL (ref 0–0.6)
HCT VFR BLD AUTO: 42 % (ref 35–47)
HGB BLD-MCNC: 14.4 G/DL (ref 12–16)
LYMPHOCYTES # BLD AUTO: 2.7 10^3/UL (ref 1–4.8)
MCH RBC QN AUTO: 29 PG (ref 27–31)
MCHC RBC AUTO-ENTMCNC: 34 G/DL (ref 31–36)
MCV RBC AUTO: 84 FL (ref 80–97)
MONOCYTES # BLD AUTO: 1.3 10^3/UL (ref 0–0.8)
NEUTROPHILS # BLD AUTO: 6.7 10^3/UL (ref 1.5–7.7)
NRBC # BLD AUTO: 0 10^3/UL
NRBC BLD QL AUTO: 0.2
PLATELET # BLD AUTO: 323 10^3/UL (ref 150–450)
RBC # BLD AUTO: 4.97 10^6/UL (ref 4–5.4)
WBC # BLD AUTO: 11.1 10^3/UL (ref 3.5–10.8)

## 2018-03-07 RX ADMIN — Medication SCH CAP: at 09:19

## 2018-03-07 RX ADMIN — FLUOXETINE SCH MG: 20 CAPSULE ORAL at 09:19

## 2018-03-07 RX ADMIN — METFORMIN HYDROCHLORIDE SCH MG: 500 TABLET, FILM COATED ORAL at 09:19

## 2018-03-07 RX ADMIN — NYSTATIN SCH: 100000 POWDER TOPICAL at 20:10

## 2018-03-07 RX ADMIN — DABIGATRAN ETEXILATE MESYLATE SCH MG: 150 CAPSULE ORAL at 20:09

## 2018-03-07 RX ADMIN — Medication SCH CAP: at 20:09

## 2018-03-07 RX ADMIN — LOSARTAN POTASSIUM SCH MG: 25 TABLET, FILM COATED ORAL at 09:19

## 2018-03-07 RX ADMIN — ATORVASTATIN CALCIUM SCH MG: 80 TABLET, FILM COATED ORAL at 16:53

## 2018-03-07 RX ADMIN — DABIGATRAN ETEXILATE MESYLATE SCH MG: 150 CAPSULE ORAL at 09:19

## 2018-03-07 RX ADMIN — METOPROLOL SUCCINATE SCH MG: 50 TABLET, EXTENDED RELEASE ORAL at 09:19

## 2018-03-07 RX ADMIN — NYSTATIN SCH APPLIC: 100000 POWDER TOPICAL at 09:25

## 2018-03-07 NOTE — PN
Progress Note


Date of Service: 03/07/18


Note: 


NUNU PRYOR was visited. Therapy notes read and reviewed. Loose stools 

slowing to 2 a day. Will monitor off Vanco. May need to give Magnesium IV. She 

is refusing fingersticks. May hold Metformin








Current Medications: 


 Active Medications











Generic Name Dose Route Start Last Admin





  Trade Name Freq  PRN Reason Stop Dose Admin


 


Acetaminophen  650 mg  02/20/18 13:28  





  Tylenol Tab*  PO   





  Q6H PRN   





  FEVER/PAIN   


 


Atorvastatin Calcium  80 mg  02/21/18 17:00  03/07/18 16:53





  Lipitor*  PO   80 mg





  1700 IGOR   Administration


 


Dabigatran  150 mg  02/20/18 21:00  03/07/18 20:09





  Pradaxa Cap(Nf)  PO   150 mg





  BID IGOR   Administration


 


Docusate Sodium  100 mg  02/24/18 09:22  





  Colace Cap*  PO   





  BID PRN   





  CONSTIPATION   


 


Fluoxetine HCl  20 mg  02/26/18 17:54  03/07/18 09:19





  Prozac Cap*  PO   20 mg





  DAILY IGOR   Administration


 


Hydralazine HCl  5 mg  02/24/18 16:08  02/24/18 16:33





  Apresoline Tab*  PO   5 mg





  Q6H PRN   Administration





  SBP >180   


 


Lactobacillus Rhamnosus  1 cap  02/22/18 21:00  03/07/18 20:09





  Culturelle*  PO   1 cap





  BID IGOR   Administration


 


Losartan Potassium  100 mg  02/21/18 09:00  03/07/18 09:19





  Cozaar Tab*  PO   100 mg





  DAILY IGOR   Administration


 


Magnesium Hydroxide  30 ml  02/20/18 13:28  





  Milk Of Magnesia Liq*  PO   





  Q6H PRN   





  CONSTIPATION   


 


Metformin HCl  250 mg  03/04/18 09:00  03/07/18 09:19





  Glucophage*  PO   250 mg





  DAILY IGOR   Administration


 


Metoprolol Succinate  50 mg  03/05/18 11:17  03/07/18 09:19





  Toprol Xl Tab*  PO   50 mg





  DAILY IGOR   Administration


 


Nystatin  1 applic  02/22/18 21:00  03/07/18 20:10





  Nystatin Top Powder*  TOPICAL   Not Given





  BID IGOR   


 


Senna  2 tab  02/20/18 13:28  





  Senokot Tab*  PO   





  BEDTIME PRN   





  CONSTIPATION   











Vital Signs: 


 Vital Signs











Temp Pulse Resp BP Pulse Ox


 


 97.4 F   49   18   166/61   95 


 


 03/07/18 15:50  03/07/18 15:50  03/07/18 15:50  03/07/18 15:50  03/07/18 19:18











Lab Results: 


 Laboratory Results - last 24 hr











  03/06/18 03/07/18 03/07/18





  22:37 05:31 05:31


 


WBC   11.1 H 


 


RBC   4.97 


 


Hgb   14.4 


 


Hct   42 


 


MCV   84 


 


MCH   29 


 


MCHC   34 


 


RDW   14 


 


Plt Count   323 


 


MPV   9 


 


Neut % (Auto)   60.8 


 


Lymph % (Auto)   24.3 L 


 


Mono % (Auto)   11.5 H 


 


Eos % (Auto)   2.2 


 


Baso % (Auto)   1.2 


 


Absolute Neuts (auto)   6.7 


 


Absolute Lymphs (auto)   2.7 


 


Absolute Monos (auto)   1.3 H 


 


Absolute Eos (auto)   0.2 


 


Absolute Basos (auto)   0.1 


 


Absolute Nucleated RBC   0 


 


Nucleated RBC %   0.2 


 


Sodium    134


 


Potassium    3.8


 


Chloride    100 L


 


Carbon Dioxide    26


 


Anion Gap    8


 


BUN    22


 


Creatinine    0.73


 


Est GFR ( Amer)    98.9


 


Est GFR (Non-Af Amer)    76.9


 


BUN/Creatinine Ratio    30.1 H


 


Glucose    95


 


POC Glucose (mg/dL)  122 H  


 


Calcium    9.1


 


Total Bilirubin    0.80


 


AST    18


 


ALT    16


 


Alkaline Phosphatase    100


 


Total Protein    6.1 L


 


Albumin    3.2


 


Globulin    2.9


 


Albumin/Globulin Ratio    1.1














  03/07/18





  08:00


 


WBC 


 


RBC 


 


Hgb 


 


Hct 


 


MCV 


 


MCH 


 


MCHC 


 


RDW 


 


Plt Count 


 


MPV 


 


Neut % (Auto) 


 


Lymph % (Auto) 


 


Mono % (Auto) 


 


Eos % (Auto) 


 


Baso % (Auto) 


 


Absolute Neuts (auto) 


 


Absolute Lymphs (auto) 


 


Absolute Monos (auto) 


 


Absolute Eos (auto) 


 


Absolute Basos (auto) 


 


Absolute Nucleated RBC 


 


Nucleated RBC % 


 


Sodium 


 


Potassium 


 


Chloride 


 


Carbon Dioxide 


 


Anion Gap 


 


BUN 


 


Creatinine 


 


Est GFR ( Amer) 


 


Est GFR (Non-Af Amer) 


 


BUN/Creatinine Ratio 


 


Glucose 


 


POC Glucose (mg/dL)  92


 


Calcium 


 


Total Bilirubin 


 


AST 


 


ALT 


 


Alkaline Phosphatase 


 


Total Protein 


 


Albumin 


 


Globulin 


 


Albumin/Globulin Ratio 











Exam: 





HEENT: L facial droop.


LUNGS: Clear bilaterally


HEART: Regular rate and rhythm


ABDOMEN: Soft, + BS, non-tender, non-distended


EXTREMITIES: No edema.


NEUROLOGIC: CN VII palsy. Some shoulder shrug on right


Assessment/Plan: 





1. Left CVA, right hemiplegia: PT/OT/SLP. Pradaxa/Lipitor. Prozac 20 mg.


2. Dysphagia: Pureed Texture, nectar thick liquids. SLP would like video 


3. Right heel ulcer: mepilex dressings.  Multipodus boot with hourly checks and 

adjustments scheduled.


4. P. Atrial Fibrillation: Pradaxa/Lopressor


5. C. Diff: Oral Vanco finished and on Culturelle BID. Hold MagOx for a day or 

two. 


6. Hypertension: Cozaar and Lopressor.   prn hydralazine. 


7. Hyperglycemia: Hgb A1c mildly elevated.  Will follow


8. Hypomagnesemia: May need IV magnesium  .


9. Advanced Directives: Has MOLST; DNR














03/07/18 22:39

## 2018-03-07 NOTE — PN
Progress Note





- Progress Note


Date of Service: 03/07/18


SOAP: 


Subjective:


cc: diarrhea


HPI: 77 year old woman with CVA, at [facility], dx with Cdif there and discharged on 

vancomycin.  One loose stool this morning, no abd pain or fever.  








Objective:





 Vital Signs











Temp  36.9 C   03/07/18 05:50


 


Pulse  55   03/07/18 05:50


 


Resp  24   03/07/18 05:50


 


BP  155/50   03/07/18 05:50


 


Pulse Ox  92   03/07/18 05:50








 Intake & Output











 03/06/18 03/07/18 03/07/18





 18:59 06:59 18:59


 


Intake Total 530  150


 


Output Total 1  


 


Balance 529  150


 


Intake:   


 


  Oral 530  150


 


Output:   


 


  # Incontinent Voids 1  


 


Other:   


 


  Estimated Void Large  Medium


 


  # Bowel Movements  1 


 


  Estimated Stool Amount  Medium 











Gen:awake, no distress


Heart:RRR no murmur


Lungs:CTA BL


Abd:+BS NTND soft


Skin: no rash


 


Assessment:


1. diarrhea; improving.  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 

cause for continued loose stool


2. CVA








Plan:


1. continue off of  vancomycin, please call if increased frequency of stools

## 2018-03-08 RX ADMIN — FLUOXETINE SCH MG: 20 CAPSULE ORAL at 08:12

## 2018-03-08 RX ADMIN — ATORVASTATIN CALCIUM SCH MG: 80 TABLET, FILM COATED ORAL at 17:12

## 2018-03-08 RX ADMIN — DABIGATRAN ETEXILATE MESYLATE SCH MG: 150 CAPSULE ORAL at 08:11

## 2018-03-08 RX ADMIN — LOSARTAN POTASSIUM SCH MG: 25 TABLET, FILM COATED ORAL at 08:12

## 2018-03-08 RX ADMIN — METFORMIN HYDROCHLORIDE SCH MG: 500 TABLET, FILM COATED ORAL at 08:11

## 2018-03-08 RX ADMIN — Medication SCH CAP: at 08:11

## 2018-03-08 RX ADMIN — METOPROLOL SUCCINATE SCH MG: 50 TABLET, EXTENDED RELEASE ORAL at 08:11

## 2018-03-08 RX ADMIN — Medication SCH CAP: at 19:59

## 2018-03-08 RX ADMIN — NYSTATIN SCH: 100000 POWDER TOPICAL at 08:12

## 2018-03-08 RX ADMIN — DABIGATRAN ETEXILATE MESYLATE SCH MG: 150 CAPSULE ORAL at 19:59

## 2018-03-08 NOTE — PN
Progress Note


Date of Service: 03/08/18


Note: 


NUNU PRYOR was visited. Therapy notes read and reviewed. She had just one 

loose stool today, but it was liquid. I have held glucophage. Will recheck labs








Current Medications: 


 Active Medications











Generic Name Dose Route Start Last Admin





  Trade Name Freq  PRN Reason Stop Dose Admin


 


Acetaminophen  650 mg  02/20/18 13:28  





  Tylenol Tab*  PO   





  Q6H PRN   





  FEVER/PAIN   


 


Atorvastatin Calcium  80 mg  02/21/18 17:00  03/08/18 17:12





  Lipitor*  PO   80 mg





  1700 IGOR   Administration


 


Dabigatran  150 mg  02/20/18 21:00  03/08/18 08:11





  Pradaxa Cap(Nf)  PO   150 mg





  BID IGOR   Administration


 


Docusate Sodium  100 mg  02/24/18 09:22  





  Colace Cap*  PO   





  BID PRN   





  CONSTIPATION   


 


Fluoxetine HCl  20 mg  02/26/18 17:54  03/08/18 08:12





  Prozac Cap*  PO   20 mg





  DAILY IGOR   Administration


 


Hydralazine HCl  5 mg  02/24/18 16:08  02/24/18 16:33





  Apresoline Tab*  PO   5 mg





  Q6H PRN   Administration





  SBP >180   


 


Lactobacillus Rhamnosus  1 cap  02/22/18 21:00  03/08/18 08:11





  Culturelle*  PO   1 cap





  BID IGOR   Administration


 


Losartan Potassium  100 mg  02/21/18 09:00  03/08/18 08:12





  Cozaar Tab*  PO   100 mg





  DAILY IGOR   Administration


 


Magnesium Hydroxide  30 ml  02/20/18 13:28  





  Milk Of Magnesia Liq*  PO   





  Q6H PRN   





  CONSTIPATION   


 


Metoprolol Succinate  50 mg  03/05/18 11:17  03/08/18 08:11





  Toprol Xl Tab*  PO   50 mg





  DAILY IGOR   Administration


 


Senna  2 tab  02/20/18 13:28  





  Senokot Tab*  PO   





  BEDTIME PRN   





  CONSTIPATION   











Vital Signs: 


 Vital Signs











Temp Pulse Resp BP Pulse Ox


 


 97.6 F   51   20   157/52   98 


 


 03/08/18 15:32  03/08/18 15:32  03/08/18 15:32  03/08/18 15:32  03/08/18 17:28











Exam: 





HEENT: L facial droop.


LUNGS: Clear bilaterally


HEART: Regular rate and rhythm


ABDOMEN: Soft, + BS, non-tender, non-distended


EXTREMITIES: No edema.


NEUROLOGIC: CN VII palsy. Some shoulder shrug on right


Assessment/Plan: 





1. Left CVA, right hemiplegia: PT/OT/SLP. Pradaxa/Lipitor. Prozac 20 mg.


2. Dysphagia: Pureed Texture, nectar thick liquids. SLP would like video 

swallow tomorrow 


3. Right heel ulcer: mepilex dressings.  Multipodus boot with hourly checks and 

adjustments scheduled.


4. P. Atrial Fibrillation: Pradaxa/Lopressor


5. C. Diff: Oral Vanco finished and on Culturelle BID. Hold MagOx for a day or 

two. 


6. Hypertension: Cozaar and Lopressor.   prn hydralazine. 


7. Hyperglycemia: Hgb A1c mildly elevated.  Will follow


8. Hypomagnesemia: May need IV magnesium  .


9. Advanced Directives: Hannah QUIROS; DNR











03/08/18 17:45

## 2018-03-09 RX ADMIN — Medication SCH CAP: at 08:11

## 2018-03-09 RX ADMIN — DABIGATRAN ETEXILATE MESYLATE SCH MG: 150 CAPSULE ORAL at 21:00

## 2018-03-09 RX ADMIN — FLUOXETINE SCH MG: 20 CAPSULE ORAL at 08:11

## 2018-03-09 RX ADMIN — METOPROLOL SUCCINATE SCH MG: 50 TABLET, EXTENDED RELEASE ORAL at 08:11

## 2018-03-09 RX ADMIN — Medication SCH CAP: at 21:00

## 2018-03-09 RX ADMIN — LOSARTAN POTASSIUM SCH MG: 25 TABLET, FILM COATED ORAL at 08:11

## 2018-03-09 RX ADMIN — ATORVASTATIN CALCIUM SCH MG: 80 TABLET, FILM COATED ORAL at 16:17

## 2018-03-09 RX ADMIN — DABIGATRAN ETEXILATE MESYLATE SCH MG: 150 CAPSULE ORAL at 08:11

## 2018-03-09 NOTE — PN
Progress Note


Date of Service: 03/09/18


Note: 


NUNU PRYOR was visited. Therapy notes read and reviewed. She had video 

swallow with SLP and did well. Diet upgraded to Mechanical ground with thin 

liquids








Current Medications: 


 Active Medications











Generic Name Dose Route Start Last Admin





  Trade Name Freq  PRN Reason Stop Dose Admin


 


Acetaminophen  650 mg  02/20/18 13:28  





  Tylenol Tab*  PO   





  Q6H PRN   





  FEVER/PAIN   


 


Atorvastatin Calcium  80 mg  02/21/18 17:00  03/09/18 16:17





  Lipitor*  PO   80 mg





  1700 IGOR   Administration


 


Dabigatran  150 mg  02/20/18 21:00  03/09/18 08:11





  Pradaxa Cap(Nf)  PO   150 mg





  BID IGOR   Administration


 


Docusate Sodium  100 mg  02/24/18 09:22  





  Colace Cap*  PO   





  BID PRN   





  CONSTIPATION   


 


Fluoxetine HCl  20 mg  02/26/18 17:54  03/09/18 08:11





  Prozac Cap*  PO   20 mg





  DAILY IGOR   Administration


 


Hydralazine HCl  5 mg  02/24/18 16:08  02/24/18 16:33





  Apresoline Tab*  PO   5 mg





  Q6H PRN   Administration





  SBP >180   


 


Lactobacillus Rhamnosus  1 cap  02/22/18 21:00  03/09/18 08:11





  Culturelle*  PO   1 cap





  BID IGOR   Administration


 


Losartan Potassium  100 mg  02/21/18 09:00  03/09/18 08:11





  Cozaar Tab*  PO   100 mg





  DAILY IGOR   Administration


 


Magnesium Hydroxide  30 ml  02/20/18 13:28  





  Milk Of Magnesia Liq*  PO   





  Q6H PRN   





  CONSTIPATION   


 


Metoprolol Succinate  50 mg  03/05/18 11:17  03/09/18 08:11





  Toprol Xl Tab*  PO   50 mg





  DAILY IGOR   Administration


 


Senna  2 tab  02/20/18 13:28  





  Senokot Tab*  PO   





  BEDTIME PRN   





  CONSTIPATION   











Vital Signs: 


 Vital Signs











Temp Pulse Resp BP Pulse Ox


 


 98.2 F   51   20   158/53   98 


 


 03/09/18 16:28  03/09/18 16:28  03/09/18 16:28  03/09/18 16:28  03/09/18 16:28











Exam: 





HEENT: L facial droop.


LUNGS: Clear bilaterally


HEART: Regular rate and rhythm


ABDOMEN: Soft, + BS, non-tender, non-distended


EXTREMITIES: No edema.


NEUROLOGIC: CN VII palsy. Some shoulder shrug on right


Assessment/Plan: 





1. Left CVA, right hemiplegia: PT/OT/SLP. Pradaxa/Lipitor. Prozac 20 mg.


2. Dysphagia: Mech Ground Texture, thin liquids.  


3. Right heel ulcer: mepilex dressings.  Multipodus boot with hourly checks and 

adjustments scheduled.


4. P. Atrial Fibrillation: Pradaxa/Lopressor


5. C. Diff: Oral Vanco finished and on Culturelle BID. Hold MagOx for a day or 

two. 


6. Hypertension: Cozaar and Lopressor.   prn hydralazine. 


7. Hyperglycemia: Hgb A1c mildly elevated.  Will follow


8. Hypomagnesemia: May need IV magnesium.


9. Advanced Directives: Has MOLST; DNR


10. Diarrhea: Slowing but stools still loose. 











03/09/18 16:44

## 2018-03-09 NOTE — RAD
HISTORY: Stroke, evaluate swallowing



COMPARISONS: None



TECHNIQUE: A videofluoroscopic evaluation of swallow was performed in conjunction with

speech therapy. Barium laced foods and liquids of varying consistency were administered

under fluoroscopic observation. Total fluoroscopy time is 2 minutes.



FINDINGS: 



ORAL PHASE: There is delayed initiation with early release of spillage of contrast from

the vallecula.



PHARYNGEAL PHASE: There is normal elevation and propulsion.



ASPIRATION/PENETRATION: There is no aspiration or penetration.



OTHER FINDINGS: None.



IMPRESSION: 

DELAYED INITIATION. NO APPRECIABLE ASPIRATION OR PENETRATION..

PLEASE SEE THE SPEECH THERAPIST REPORT FOR FURTHER INFORMATION



CPT II Codes: 6045F

## 2018-03-10 RX ADMIN — DABIGATRAN ETEXILATE MESYLATE SCH MG: 150 CAPSULE ORAL at 09:24

## 2018-03-10 RX ADMIN — FLUOXETINE SCH MG: 20 CAPSULE ORAL at 09:24

## 2018-03-10 RX ADMIN — Medication SCH CAP: at 09:23

## 2018-03-10 RX ADMIN — HYDRALAZINE HYDROCHLORIDE PRN MG: 10 TABLET ORAL at 07:16

## 2018-03-10 RX ADMIN — ATORVASTATIN CALCIUM SCH MG: 80 TABLET, FILM COATED ORAL at 17:19

## 2018-03-10 RX ADMIN — LOSARTAN POTASSIUM SCH MG: 25 TABLET, FILM COATED ORAL at 09:24

## 2018-03-10 RX ADMIN — DABIGATRAN ETEXILATE MESYLATE SCH MG: 150 CAPSULE ORAL at 20:05

## 2018-03-10 RX ADMIN — Medication SCH CAP: at 20:05

## 2018-03-10 RX ADMIN — METOPROLOL SUCCINATE SCH MG: 50 TABLET, EXTENDED RELEASE ORAL at 09:23

## 2018-03-10 NOTE — PN
Progress Note


Date of Service: 03/10/18


Note: 


NUNU PRYOR was visited. Nursing notes read and reviewed. She is doing ok, 

feels tired. 








Current Medications: 


 Active Medications











Generic Name Dose Route Start Last Admin





  Trade Name Freq  PRN Reason Stop Dose Admin


 


Acetaminophen  650 mg  02/20/18 13:28  





  Tylenol Tab*  PO   





  Q6H PRN   





  FEVER/PAIN   


 


Atorvastatin Calcium  80 mg  02/21/18 17:00  03/09/18 16:17





  Lipitor*  PO   80 mg





  1700 IGOR   Administration


 


Dabigatran  150 mg  02/20/18 21:00  03/10/18 09:24





  Pradaxa Cap(Nf)  PO   150 mg





  BID IGOR   Administration


 


Docusate Sodium  100 mg  02/24/18 09:22  





  Colace Cap*  PO   





  BID PRN   





  CONSTIPATION   


 


Fluoxetine HCl  20 mg  02/26/18 17:54  03/10/18 09:24





  Prozac Cap*  PO   20 mg





  DAILY IGOR   Administration


 


Hydralazine HCl  5 mg  02/24/18 16:08  03/10/18 07:16





  Apresoline Tab*  PO   5 mg





  Q6H PRN   Administration





  SBP >180   


 


Lactobacillus Rhamnosus  1 cap  02/22/18 21:00  03/10/18 09:23





  Culturelle*  PO   1 cap





  BID IGOR   Administration


 


Losartan Potassium  100 mg  02/21/18 09:00  03/10/18 09:24





  Cozaar Tab*  PO   100 mg





  DAILY IGOR   Administration


 


Magnesium Hydroxide  30 ml  02/20/18 13:28  





  Milk Of Magnesia Liq*  PO   





  Q6H PRN   





  CONSTIPATION   


 


Metoprolol Succinate  50 mg  03/05/18 11:17  03/10/18 09:23





  Toprol Xl Tab*  PO   50 mg





  DAILY IGOR   Administration


 


Senna  2 tab  02/20/18 13:28  





  Senokot Tab*  PO   





  BEDTIME PRN   





  CONSTIPATION   











Vital Signs: 


 Vital Signs











Temp Pulse Resp BP Pulse Ox


 


 98.6 F   58   24   163/59   96 


 


 03/10/18 15:42  03/10/18 15:42  03/10/18 15:42  03/10/18 15:42  03/10/18 15:42











Exam: 





HEENT: L facial droop.


LUNGS: Clear bilaterally


HEART: Regular rate and rhythm


ABDOMEN: Soft, + BS, non-tender, non-distended


EXTREMITIES: No edema.


NEUROLOGIC: CN VII palsy. Some shoulder shrug on right


Assessment/Plan: 





1. Left CVA, right hemiplegia: PT/OT/SLP. Pradaxa/Lipitor. Prozac 20 mg.


2. Dysphagia: Mech Ground Texture, thin liquids.  


3. Right heel ulcer: mepilex dressings.  Multipodus boot with hourly checks and 

adjustments scheduled.


4. P. Atrial Fibrillation: Pradaxa/Lopressor


5. C. Diff: Oral Vanco finished and on Culturelle BID. Hold MagOx for a day or 

two. 


6. Hypertension: Cozaar and Lopressor.   prn hydralazine. 


7. Hyperglycemia: Hgb A1c mildly elevated.  Will follow


8. Hypomagnesemia: May need IV magnesium.


9. Advanced Directives: Has MOLST; DNR


10. Diarrhea: Slowing, had a semi solid BM today. 











03/10/18 16:00

## 2018-03-11 RX ADMIN — Medication SCH CAP: at 08:59

## 2018-03-11 RX ADMIN — LOSARTAN POTASSIUM SCH MG: 25 TABLET, FILM COATED ORAL at 08:58

## 2018-03-11 RX ADMIN — FLUOXETINE SCH MG: 20 CAPSULE ORAL at 08:58

## 2018-03-11 RX ADMIN — DABIGATRAN ETEXILATE MESYLATE SCH MG: 150 CAPSULE ORAL at 20:58

## 2018-03-11 RX ADMIN — ATORVASTATIN CALCIUM SCH MG: 80 TABLET, FILM COATED ORAL at 17:27

## 2018-03-11 RX ADMIN — DABIGATRAN ETEXILATE MESYLATE SCH MG: 150 CAPSULE ORAL at 08:59

## 2018-03-11 RX ADMIN — Medication SCH CAP: at 20:58

## 2018-03-11 RX ADMIN — METOPROLOL SUCCINATE SCH MG: 50 TABLET, EXTENDED RELEASE ORAL at 08:58

## 2018-03-11 NOTE — PN
Progress Note


Date of Service: 03/11/18


Note: 


NUNU PRYOR was visited. Nursing notes read and reviewed. Stools are semi-

formed. Will check Magnesium level in am. 











Current Medications: 


 Active Medications











Generic Name Dose Route Start Last Admin





  Trade Name Freq  PRN Reason Stop Dose Admin


 


Acetaminophen  650 mg  02/20/18 13:28  





  Tylenol Tab*  PO   





  Q6H PRN   





  FEVER/PAIN   


 


Atorvastatin Calcium  80 mg  02/21/18 17:00  03/10/18 17:19





  Lipitor*  PO   80 mg





  1700 IGOR   Administration


 


Dabigatran  150 mg  02/20/18 21:00  03/11/18 08:59





  Pradaxa Cap(Nf)  PO   150 mg





  BID IGOR   Administration


 


Docusate Sodium  100 mg  02/24/18 09:22  





  Colace Cap*  PO   





  BID PRN   





  CONSTIPATION   


 


Fluoxetine HCl  20 mg  02/26/18 17:54  03/11/18 08:58





  Prozac Cap*  PO   20 mg





  DAILY IGOR   Administration


 


Hydralazine HCl  5 mg  02/24/18 16:08  03/10/18 07:16





  Apresoline Tab*  PO   5 mg





  Q6H PRN   Administration





  SBP >180   


 


Lactobacillus Rhamnosus  1 cap  02/22/18 21:00  03/11/18 08:59





  Culturelle*  PO   1 cap





  BID IGOR   Administration


 


Losartan Potassium  100 mg  02/21/18 09:00  03/11/18 08:58





  Cozaar Tab*  PO   100 mg





  DAILY IGOR   Administration


 


Magnesium Hydroxide  30 ml  02/20/18 13:28  





  Milk Of Magnesia Liq*  PO   





  Q6H PRN   





  CONSTIPATION   


 


Metoprolol Succinate  50 mg  03/05/18 11:17  03/11/18 08:58





  Toprol Xl Tab*  PO   50 mg





  DAILY IGOR   Administration


 


Senna  2 tab  02/20/18 13:28  





  Senokot Tab*  PO   





  BEDTIME PRN   





  CONSTIPATION   











Vital Signs: 


 Vital Signs











Temp Pulse Resp BP Pulse Ox


 


 97.9 F   56   16   156/63   94 


 


 03/11/18 05:02  03/11/18 05:02  03/11/18 05:02  03/11/18 05:02  03/11/18 11:40











Exam: 





HEENT: L facial droop.


LUNGS: Clear bilaterally


HEART: Regular rate and rhythm


ABDOMEN: Soft, + BS, non-tender, non-distended


EXTREMITIES: No edema.


NEUROLOGIC: Some shoulder shrug on right


Assessment/Plan: 





1. Left CVA, right hemiplegia: PT/OT/SLP. Pradaxa/Lipitor. Prozac 20 mg.


2. Dysphagia: Mech Ground Texture, thin liquids.  


3. Right heel ulcer: mepilex dressings.  Multipodus boot with hourly checks and 

adjustments scheduled.


4. P. Atrial Fibrillation: Pradaxa/Lopressor


5. C. Diff: Oral Vanco finished and on Culturelle BID. Hold MagOx for a day or 

two. 


6. Hypertension: Cozaar and Lopressor.   prn hydralazine. 


7. Hyperglycemia: Hgb A1c mildly elevated.  Will follow


8. Hypomagnesemia: May need IV magnesium. Will check in am


9. Advanced Directives: Has MOLST; DNR


10. Diarrhea: Slowing, had a semi solid BM today. 








03/11/18 15:43

## 2018-03-12 RX ADMIN — Medication SCH CAP: at 19:56

## 2018-03-12 RX ADMIN — ATORVASTATIN CALCIUM SCH MG: 80 TABLET, FILM COATED ORAL at 16:56

## 2018-03-12 RX ADMIN — DABIGATRAN ETEXILATE MESYLATE SCH MG: 150 CAPSULE ORAL at 07:38

## 2018-03-12 RX ADMIN — DABIGATRAN ETEXILATE MESYLATE SCH MG: 150 CAPSULE ORAL at 19:56

## 2018-03-12 RX ADMIN — METOPROLOL SUCCINATE SCH MG: 50 TABLET, EXTENDED RELEASE ORAL at 07:38

## 2018-03-12 RX ADMIN — Medication SCH CAP: at 07:38

## 2018-03-12 RX ADMIN — FLUOXETINE SCH MG: 20 CAPSULE ORAL at 07:38

## 2018-03-12 RX ADMIN — LOSARTAN POTASSIUM SCH MG: 25 TABLET, FILM COATED ORAL at 07:38

## 2018-03-12 NOTE — PN
Progress Note


Date of Service: 03/12/18


Note: 


NUNU PRYOR was visited. Therapy notes read and reviewed. She is happy 

about eating foods but remains impaired with right sided weakness. 








Current Medications: 


 Active Medications











Generic Name Dose Route Start Last Admin





  Trade Name Freq  PRN Reason Stop Dose Admin


 


Acetaminophen  650 mg  02/20/18 13:28  





  Tylenol Tab*  PO   





  Q6H PRN   





  FEVER/PAIN   


 


Atorvastatin Calcium  80 mg  02/21/18 17:00  03/11/18 17:27





  Lipitor*  PO   80 mg





  1700 IGOR   Administration


 


Dabigatran  150 mg  02/20/18 21:00  03/12/18 07:38





  Pradaxa Cap(Nf)  PO   150 mg





  BID IGOR   Administration


 


Docusate Sodium  100 mg  02/24/18 09:22  





  Colace Cap*  PO   





  BID PRN   





  CONSTIPATION   


 


Fluoxetine HCl  20 mg  02/26/18 17:54  03/12/18 07:38





  Prozac Cap*  PO   20 mg





  DAILY IGOR   Administration


 


Hydralazine HCl  5 mg  02/24/18 16:08  03/10/18 07:16





  Apresoline Tab*  PO   5 mg





  Q6H PRN   Administration





  SBP >180   


 


Lactobacillus Rhamnosus  1 cap  02/22/18 21:00  03/12/18 07:38





  Culturelle*  PO   1 cap





  BID IGOR   Administration


 


Losartan Potassium  100 mg  02/21/18 09:00  03/12/18 07:38





  Cozaar Tab*  PO   100 mg





  DAILY IGOR   Administration


 


Magnesium Hydroxide  30 ml  02/20/18 13:28  





  Milk Of Magnesia Liq*  PO   





  Q6H PRN   





  CONSTIPATION   


 


Metoprolol Succinate  50 mg  03/05/18 11:17  03/12/18 07:38





  Toprol Xl Tab*  PO   50 mg





  DAILY IGOR   Administration


 


Senna  2 tab  02/20/18 13:28  





  Senokot Tab*  PO   





  BEDTIME PRN   





  CONSTIPATION   











Vital Signs: 


 Vital Signs











Temp Pulse Resp BP Pulse Ox


 


 97.6 F   60   20   177/61   97 


 


 03/12/18 05:49  03/12/18 07:38  03/12/18 05:49  03/12/18 05:49  03/12/18 05:49











Lab Results: 


 Laboratory Results - last 24 hr











  03/12/18





  05:44


 


Sodium  135


 


Potassium  4.0


 


Chloride  102


 


Carbon Dioxide  29


 


Anion Gap  4


 


BUN  22


 


Creatinine  0.78


 


Est GFR ( Amer)  91.6


 


Est GFR (Non-Af Amer)  71.2


 


BUN/Creatinine Ratio  28.2 H


 


Glucose  94


 


Calcium  9.0


 


Magnesium  1.5 L


 


Total Bilirubin  0.60


 


AST  20


 


ALT  19


 


Alkaline Phosphatase  97


 


Total Protein  5.9 L


 


Albumin  3.1 L


 


Globulin  2.8


 


Albumin/Globulin Ratio  1.1











Exam: 





HEENT: L facial droop.


LUNGS: Clear bilaterally


HEART: Regular rate and rhythm


ABDOMEN: Soft, + BS, non-tender, non-distended


EXTREMITIES: No edema.


NEUROLOGIC: Some shoulder shrug on right


Assessment/Plan: 





1. Left CVA, right hemiplegia: PT/OT/SLP. Pradaxa/Lipitor. Prozac 20 mg.


2. Dysphagia: Mech Ground Texture, thin liquids.  


3. Right heel ulcer: mepilex dressings.  Multipodus boot with hourly checks and 

adjustments scheduled.


4. P. Atrial Fibrillation: Pradaxa/Lopressor


5. Hypertension: Cozaar and Lopressor.   prn hydralazine. 


6. Hyperglycemia: Hgb A1c mildly elevated.  Will follow


7. Hypomagnesemia: May need IV magnesium. Mg still ow today


8. Advanced Directives: Has MOLST; DNR


9. Diarrhea: Slowing, had a semi solid BM today. 











03/12/18 16:00

## 2018-03-13 RX ADMIN — DABIGATRAN ETEXILATE MESYLATE SCH MG: 150 CAPSULE ORAL at 19:37

## 2018-03-13 RX ADMIN — LOSARTAN POTASSIUM SCH MG: 25 TABLET, FILM COATED ORAL at 09:50

## 2018-03-13 RX ADMIN — Medication SCH CAP: at 19:37

## 2018-03-13 RX ADMIN — ATORVASTATIN CALCIUM SCH MG: 80 TABLET, FILM COATED ORAL at 16:36

## 2018-03-13 RX ADMIN — Medication SCH CAP: at 09:50

## 2018-03-13 RX ADMIN — FLUOXETINE SCH MG: 20 CAPSULE ORAL at 09:50

## 2018-03-13 RX ADMIN — DABIGATRAN ETEXILATE MESYLATE SCH MG: 150 CAPSULE ORAL at 09:50

## 2018-03-13 RX ADMIN — METOPROLOL SUCCINATE SCH MG: 50 TABLET, EXTENDED RELEASE ORAL at 09:50

## 2018-03-13 NOTE — PN
Progress Note


Date of Service: 03/13/18


Note: 


NUNU PRYOR was visited. Therapy notes read and reviewed. She was discussed 

in interdisciplinary team rounds. She has made gains with swallowing, but in 

her mobility remains impaired. Will need to have a family meeting prior soon to 

discuss family training








Current Medications: 


 Active Medications











Generic Name Dose Route Start Last Admin





  Trade Name Freq  PRN Reason Stop Dose Admin


 


Acetaminophen  650 mg  02/20/18 13:28  





  Tylenol Tab*  PO   





  Q6H PRN   





  FEVER/PAIN   


 


Atorvastatin Calcium  80 mg  02/21/18 17:00  03/13/18 16:36





  Lipitor*  PO   80 mg





  1700 IGOR   Administration


 


Dabigatran  150 mg  02/20/18 21:00  03/13/18 09:50





  Pradaxa Cap(Nf)  PO   150 mg





  BID IGOR   Administration


 


Docusate Sodium  100 mg  02/24/18 09:22  





  Colace Cap*  PO   





  BID PRN   





  CONSTIPATION   


 


Fluoxetine HCl  20 mg  02/26/18 17:54  03/13/18 09:50





  Prozac Cap*  PO   20 mg





  DAILY IGOR   Administration


 


Hydralazine HCl  5 mg  02/24/18 16:08  03/10/18 07:16





  Apresoline Tab*  PO   5 mg





  Q6H PRN   Administration





  SBP >180   


 


Lactobacillus Rhamnosus  1 cap  02/22/18 21:00  03/13/18 09:50





  Culturelle*  PO   1 cap





  BID IGOR   Administration


 


Losartan Potassium  100 mg  02/21/18 09:00  03/13/18 09:50





  Cozaar Tab*  PO   100 mg





  DAILY IGOR   Administration


 


Magnesium Hydroxide  30 ml  02/20/18 13:28  





  Milk Of Magnesia Liq*  PO   





  Q6H PRN   





  CONSTIPATION   


 


Metoprolol Succinate  50 mg  03/05/18 11:17  03/13/18 09:50





  Toprol Xl Tab*  PO   50 mg





  DAILY IGOR   Administration


 


Senna  2 tab  02/20/18 13:28  





  Senokot Tab*  PO   





  BEDTIME PRN   





  CONSTIPATION   











Vital Signs: 


 Vital Signs











Temp Pulse Resp BP Pulse Ox


 


 98.0 F   49   18   176/80   98 


 


 03/13/18 15:52  03/13/18 15:52  03/13/18 15:52  03/13/18 15:52  03/13/18 15:52











Exam: 





HEENT: L facial droop.


LUNGS: Clear bilaterally


HEART: Regular rate and rhythm


ABDOMEN: Soft, + BS, non-tender, non-distended


EXTREMITIES: No edema.


NEUROLOGIC: Some shoulder shrug on right


Assessment/Plan: 





1. Left CVA, right hemiplegia: PT/OT/SLP. Pradaxa/Lipitor. Prozac 20 mg.


2. Dysphagia: Mech Ground Texture, thin liquids.  


3. Right heel ulcer: mepilex dressings.  Multipodus boot with hourly checks and 

adjustments scheduled.


4. P. Atrial Fibrillation: Pradaxa/Lopressor


5. Hypertension: Cozaar and Lopressor.   prn hydralazine. 


6. Hyperglycemia: Hgb A1c mildly elevated.  Will follow


7. Hypomagnesemia: May need IV magnesium. Mg still low will see as her diet and 

intake improve if her Mg will rise


8. Advanced Directives: Has MOLST; DNR


9. Diarrhea: Slowing, had a semi solid BM. 








03/13/18 16:44

## 2018-03-13 NOTE — PMRUTEAM
PMRU: Goals


Current Status: 


 Nursing: Current Status











Skin Deviations [Right Lower   Abrasion





Leg]                           


 


Skin Deviations [Buttocks]     Other


 


Skin Deviations [Right Heel]   Wound,Other


 


Skin Deviations [Left Heel]    Other


 


Skin Deviation Description [   Skin protective cream applied





Right Lower Leg]               


 


Skin Deviation Description [   redness blanchable





Buttocks]                      


 


Skin Deviation Description [   mepelex





Right Heel]                    


 


Skin Deviation Description [   Spanko boot in place, redness - blanchable





Left Heel]                     


 


Wound Stage [Right Heel]       II











 Physical Therapy: Current Status











Bed Mobility Assistance        Mod Assist


 


Transfer Moblility Assistance  Mod Assist


 


Transfer/Bed Mobility          None





Recommended Devices            


 


Transfer Mobility Comment      mod to max A x 1 SPT


 


Ambulation Assistance          Unable


 


Ambulation Assistive Devices   Joey Walker


 


Number of Feet Patient         15





Ambulated                      


 


Ambulation Comment             Pt. is able to transition from sit to stand min 

to





 mod A x 1.


 


Stairs Assistance              Not Tested


 


Curb                           Not Tested














 Occupational Therapy: Current Status











Upper Body Dressing            Min Assist


 


Upper Body Dressing Progress   with max v/c's


 


Lower Body Dressing            Max Asst,2 Person Assist


 


Lower Body Dressing Progress   2 person assist in standing, pt able to thread 

LLE





 seated in chair


 


Bathing                        Mod Assist,2 Person Assist


 


Bathing Progress               2 person assist in standing, assist for LUE and





 simona lower legs/feet


 


Toileting                      Total Assist,2 Person Assist


 


Toileting Progress             1 person assist for standing balance,assist of





 another for clothing/hygiene


 


Toilet Transfer                Max Asst


 


Toilet Transfer Progress       SPT with hemisling/gait belt, blocking right knee

,





 mod v/c's


 


Shower Transfer                Total Assist


 


Shower Transfer Progress       roll in shower


 


Eating                         Supervision


 


Eating Progress                setupA














 Rec Therapy: Current Status











Summary of Assessment and      RT assessment completed , pt. is aware of





Clinical Impression            services and engages during leisure visits.  Pt.





 enjoys and completes word puzzles during free-time





 and engages in pet visitation on the weekend.


 


Treatment Goals                Patient will engage in recreation and leisure





 activities while on the unit.


 


Treatment Plan                 Will follow up with patient regularly for leisure





 visits and provide recreational activities as





 appropriate.














 Social Work: Current Status











Discharge Plan                 return home with home care svs and family support


 


Potential for Family Training  pt's daughter and granddaughter are involved and





 attentive


 


Anticipated Discharge          Home





Destination                    


 


Discharge With                 home care svs and family support














 Nutrition: Current Status











Monitoring                     diet changed to consistent carb (3/3).  VFSS





 performed 3/9; diet upgraded mech ground solids,





 THIN liquids.  PO intake notable improved,





 averaging 75% of meals since upgraded.  Requires





 monitoring during meals for safety and menu





 selections reviewed and modified daily by RDN as





 appropriate.  Mag remains low (1.5); may slowly





 improve now that po intake increased.  If diarrhea





 controlled, suggest resume Magox (400mg/day).





 Also suggest obtain weekly weight.











 Speech: Current Status











Assessment                     Pt is progressing as expected, with improvement 

in





 functional reading, verba memory and sequencing.














Goals: 


 Physical Therapy: Initial Goals











Bed Mobility Assistance        Independent


 


Transfer Mobility Assistance   Independent


 


Transfer/Bed Mobility          Joey Walker





Recommended Devices            


 


Ambulation                     Independent


 


Ambulation Recommended Devices Joey Walker


 


Ambulation Distance            50


 


Stairs Assistance              Independent


 


Stair Recommended Devices      One Rail


 


Number of Stairs               5














 Physical Therapy: Updated Goals











Bed Mobility Assistance        Independent


 


Transfer Mobility Assistance   Independent


 


Transfer/Bed Mobility          Joey Walker





Recommended Devices            


 


Ambulation Assistance          Independent


 


Ambulation Assistive Devices   Joey Walker


 


Stairs Assistance              Independent


 


Stairs Recommended Devices     One Rail


 


Number of Stairs               5














 Occupational Therapy: Initial Goals











Goals to be Completed in (Days 4-6 weeks





)                              


 


Upper Body Bathing Routine     Modified Independent with


 


Lower Body Bathing Routine     Modified Independent with


 


Upper Body Dressing Routine    Modified Independent with


 


Lower Body Dressing Routine    Modified Independent with


 


Toilet Hygeine and Clothing    Modified Independent with





Management Routine             


 


Toilet Transfer Routine        Modified Independent with


 


Step-In Shower Transfer        Modified Independent with





Routine                        


 


Tub Transfer Routine           Modified Independent with


 


Functional Transfers for ADL   Modified Independent with


 


Grooming Routine               Modified Independent with


 


Feeding Routine                Modified Independent with














 Nutrition: Goals











Intervention Goals             1. pt will tolerate least-restrictive diet 

texture





 and liquid consistency without s/sx aspiration





 2. adequate po intake to maintain lean body mass





 and hydration; no clinical s/sx dehydration





 3. bowel pattern will be regulated; no c/o





 diarrhea (or constipation)





 4. Skin will show signs of healing without further





 breakdown





 5. achieve and maintain serum K within





 satisfactory range





 6. BG will be adequately controlled per inpt





 parameters





 7. Any further instruction will be provided prior





 to d/c











 Speech: Goals











Speech Goal 1                  Swallowing


 


Goal 1 Comments                Videofluoroscopic swallow study on 3/9/18 showed





 adequate oral preparation and control of soft





 solid consistencie and thin liquids, R-side oral





 residue of chopped solids, adequate phayngeal





 swallows function, and no laryngeal penetration or





 tracheal aspiration of thin liquids. SLP





 recommended and provider ordered upgrade to





 mechanical soft/ground diet and thin liquids. As





 per nsg notes and verbal reports, pt has since





 consistently tolerated upgrade w/ minimal oral





 pocketing that staff have cleared with oral care.





 No clinical s/s aspiration have been reported, and





 pt has maintained clear lung sounds.





 





 Long-Term Goal: Pt will tolerate least restrictive





 diet consistencies with adequate mastication, no





 oral residue, and no clinical s/s or complications





 from aspiration.





 Short-Term Goals





 1) STG: (Pureed consistency and nectar liquids).





 Met.





 2) STG: Pt will will demonstrate adequate





 mastication of soft solids, using compensatory





 strategies to clear oral residue from R side of





 oral cavity, given minimal cueing.





 Status:  Met





 3) STG: Pt will tolerate thin liquids at meals





 with no clinical s/s or complications from





 aspiration and adequate PO intake.





 Status: Met.





 New Short-term Goal:





 4) STG: Pt will tolerate least restrictive diet





 consistencies with adequate mastication, no oral





 residue, and no clinical s/s or complications from





 aspiration.





 Status:  Ongoing





 


 


Speech Goal 2                  Problem Solving


 


Speech Goal 2 Comments         Pt continues to demonstAte steady improvement in





 problem solving skills with advances in attention,





 memory, reasoning, reading and writing.





 Long-Term Goals:





 1) LTG: Pt will solve functional daily problems





 independently using compensatory strategies as





 needed.





 2) LTG: Pt will read and write basic functional





 information, using non-dominant hand and





 compensatory strategies independently.





 Short-Term Goals:





 1) STG:  Pt will demonstrate use of compensatory





 strategies to solve simple memory, feeding and





 self-care problems, including attention to the





 Rght side of her work area and tasks.





 Status:  Ongoing. Pt answered orientation





 questions accurately. Given verbal stimuli, pt





 recalled 3 words with 100% accuracy and answered





 sequencing questions with 80% accuracy given





 minimal cueing. Pt recalled 4 words answered





 sequencing questions with with 70% accuracy, most





 frequently forgetting the first words. Pt improved





 performance when given the question before the





 list, to 80% accuracy.





 2) STG:  Pt will read simple 1-2 step directions,





 using compensatory strategies, with 80% accuracy,





 given Minimal cueing. Status:  Ongoing.





 Status: Met at criteria of 80% accuracy with





 minimal cues. Revised goal:





 Pt will read simple 2 step directions, using





 compensatory strategies, with 90% accuracy, given





 Minimal cueing.





 3) STG: Pt will draw basic shapes and write





 letters, using nondominant Left hand and





 compensatory strategies.





 Status:  Ongoing. Patient copied letters





 accurately, and required moderate cueing to read





 letters by recalling sequence. Pt required "count





 up" strategy to recognize letters or days in





 sequence.














 Social Work: Goals











Discharge Plan                 return home with home care svs and family support


 


Potential for Family Training  pt's daughter and granddaughter are involved and





 attentive


 


Anticipated Discharge          Home





Destination                    


 


Discharge With                 home care svs and family support

















Care Plan: 


 Care Plan





ADL's - Improve/Maintain                Start:  18 15:29              


Freq:   QSHIFT                          Status: Active      Target:         


Protocol:                                                                   








Activity Type Activity Date Activity User E-Sign Co-Sign Detail





Recorded Client Recorded Date Recorded By   


 


Document 18 16:22 [id number]   





[id number] 18 16:22 [id number]   














  18





  16:22


 


PMRU Outcome: ADL's/ADL Transfers 


 


Orders/Interventions Occupational





 Therapy





 Evaluation &





 Treatment





 Communication





 Tool in Patient





 Room


 


Address Deficits Secondary To: [place]


 


Patient to receive OT 5x/wk for  Therex





min/day Self Care





 Management





 Group Therapy





 Neuromuscular





 ReEducation


 


UE/LE ADL's with Assist Yes: Iraj


 


ADL Transfers with Assist Yes: Iraj


 


Toileting: Transfers,Clothing Management Yes: Iraj





,Hygeine w/Assist 


 


Light Kitchen/Laundry w/Assist Yes: Bernarda


 


Progression Toward Outcome/Goals Progressing


 


Outcome/Goals Met Pt participated





 well in OT





 treatment





 session,





 increased





 alertness and





 participation,





 pt appears





 pleased to be





 on upgraded





 diet since





 Friday and





 appears to have





 more energy,





 improved





 sitting balance





 from late last





 week as well.








Communication-Improve/Maintain          Start:  18 02:06              


Freq:   QSHIFT                          Status: Active      Target:         


Protocol:                                                                   








Activity Type Activity Date Activity User E-Sign Co-Sign Detail





Recorded Client Recorded Date Recorded By   


 


Document 18 01:10 [id number]   





PMRU-C03 18 01:10 [id number]   














  18





  01:10


 


PMRU Outcome: Communication/Cognitive 





Status 


 


Outcome/Goals Use Comm Tools/





 Devices





 Makes Needs





 Known





 Effectively


 


Other Outcomes/Goals Long-Term Goals





 , Problem





 Solvin) LTG: Pt will





 solve





 functional





 daily problems





 independently





 using





 compensatory





 strategies





 as needed.





 Status:





 Ongoing.





 2) LTG: Pt will





 read and write





 basic





 functional





 information,





 using non-





 dominant hand





 and





 compensatory





 strategies





 independently.





 Short-Term





 Goals:





 1) STG:  Pt





 will





 demonstrate use





 of





 compensatory





 strategies to





 solve simple





 memory, feeding





 and self-care





 problems,





 including





 attention to





 the Rght side





 of her work





 area and tasks.





 2) STG:  Pt





 will read





 simple 1-2 step





 directions,





 using





 compensatory





 strategies,





 with 80%





 accuracy, given





 Minimal cueing





 .





 3) STG: Pt will





 draw basic





 shapes and





 write letters,





 using





 nondominant





 Left hand and





 compensatory





 strategies.


 


Progression Toward Outcomes/Goals Progressing








Coping/Psych-Improve/Maintain           Start:  18 02:06              


Freq:   QSHIFT                          Status: Active      Target:         


Protocol:                                                                   








Activity Type Activity Date Activity User E-Sign Co-Sign Detail





Recorded Client Recorded Date Recorded By   


 


Document 18 01:10 [id number]   





PMRU-C03 18 01:10 [id number]   














  18





  01:10


 


PMRU Outcome: Coping/Psychosocial 


 


Coping Outcome/Goals Verbalization





 of Acceptance





 of Rehab Admit





 Verbalization





 of Sense of





 Control Over





 Health Status





 Utilization of





 Appropriate





 Problem Solving





 Techniques





 Willingness to





 Participate in





 Treatment Plan





 and Basic Needs





 Utilization of





 Available





 Support Systems





 Absence of





 Destructive





 Behavior to





 Self/Others


 


Psychosocial Outcome/Goals Maintain/





 Improve





 Emotional





 Health


 


Progression Toward Outcome/Goals - Progressing





Coping 


 


Progression Toward Outcome/Goals - Progressing





Psychosocial 








Discharge Planning - Improve/Maintain   Start:  18 02:06              


Freq:   QSHIFT                          Status: Active      Target:         


Protocol:                                                                   








Activity Type Activity Date Activity User E-Sign Co-Sign Detail





Recorded Client Recorded Date Recorded By   


 


Document 18 01:10 [id number]   





PMRU-C03 18 01:10 [id number]   














  18





  01:10


 


PMRU Outcome: Discharge Planning 


 


Identify Patient Needs yes


 


Update Patient Family No


 


Outcome/Goals Demonstrates





 Understanding





 of Discharge





 Plan


 


Progression Toward Outcome/Goals Progressing








Education-Improve/Maintain              Start:  18 02:06              


Freq:   QSHIFT                          Status: Active      Target:         


Protocol:                                                                   








Activity Type Activity Date Activity User E-Sign Co-Sign Detail





Recorded Client Recorded Date Recorded By   


 


Document 18 01:10 [id number]   





PMRU-C03 18 01:10 [id number]   














  18





  01:10


 


PMRU Outcome: Education 


 


Outcome/Goals Demonstrate/





 Verbalize





 Understanding





 of Written





 Discharge





 Instructions





 Demonstrates





 Skills





 Encourage





 Questions


 


Progression Toward Outcome/Goals Progressing








/GI-Improve/Maintain                  Start:  18 02:06              


Freq:   QSHIFT                          Status: Active      Target:         


Protocol:                                                                   








Activity Type Activity Date Activity User E-Sign Co-Sign Detail





Recorded Client Recorded Date Recorded By   


 


Document 18 01:10 [id number]   





PMRU-C03 18 01:10 [id number]   














  18





  01:10


 


PMRU Outcome: Genitourinary/ 





Gastrointestinal 


 


Genitourinary- Outcome/Goals Maintain/





 Achieve





 Adequate





 Urinary Output





 Remain Free of





 Hospital-





 Acquired UTI


 


Gastrointestinal-Outcome/Goals Maintain/





 Achieve Bowel





 Regularity in





 Accordance with





 Pt's Baseline





 Prevent





 Constipation


 


Progression Toward Outcome/Goals -  Progressing


 


Progression Toward Outcome/Goals - GI Progressing








Medication Administration               Start:  18 02:06              


Freq:   QSHIFT                          Status: Active      Target:         


Protocol:                                                                   








Activity Type Activity Date Activity User E-Sign Co-Sign Detail





Recorded Client Recorded Date Recorded By   


 


Document 18 01:10 [id number]   





PMRU-C03 18 01:10 [id number]   














  18





  01:10


 


PMRU Outcome: Medication Administration 


 


Assess Patient Knowledge/Teach Med Yes





Education for all Meds 


 


Outcome/Goals Family/





 Caregiver





 Administer





 Medications at





 Home


 


Progression Towards Outcome/Goals Progressing


 


Is Patient Going Home on Lovenox? No








Mobility- Improve/Maintain              Start:  18 18:22              


Freq:   QSHIFT                          Status: Active      Target:         


Protocol:                                                                   








Activity Type Activity Date Activity User E-Sign Co-Sign Detail





Recorded Client Recorded Date Recorded By   


 


Document 18 17:32 [id number]   





SSU-C14 18 17:32 [id number]   














  18





  17:32


 


PMRU Outcome: Mobility 


 


Physical Therapy Evaluation and Yes





Treatment 


 


Activity OOB with Assistance Yes


 


Device Yes


 


Assistance Yes


 


Patient to be seen 5x/wk for  min/ Therex





day for: Mobility





 Training





 Gait Training





 W/C Mobility





 Balance


 


Outcome/Goals Maintain/





 Achieve





 Baseline





 Mobility Status





 Improve





 Mobility Status





 Demonstrates





 Proper Use of





 Assistive





 Devices





 Free from





 Complications





 of Immobility


 


Progression Toward Outcome/Goals Progressing


 


Bed Mobility Yes:





 independent


 


Transfers Yes:





 independnet





 with joey





 walker


 


Gait x ft Yes:





 independent





 with joey





 walker to





 household





 distances (50')


 


Up/Down Stairs Yes:





 independent





 with 1 rail up/





 down 5








Neurological- Improve/Maintain          Start:  18 02:06              


Freq:   QSHIFT                          Status: Active      Target:         


Protocol:                                                                   








Activity Type Activity Date Activity User E-Sign Co-Sign Detail





Recorded Client Recorded Date Recorded By   


 


Document 18 01:10 [id number]   





PMRU-C03 18 01:10 [id number]   














  18





  01:10


 


PMRU Outcome: Neurological 


 


Weakness/Aphasia Weakness





 Right Side


 


Outcome/Goals Improve





 Neurological





 Status





 Prevent





 Avoidable





 Neurological





 Decline





 Maintain/





 Improve





 Strength/ROM


 


Progression Toward Outcome/Goals Progressing








Nutrition/Swallowing- Improve/Maintain  Start:  18 02:06              


Freq:   QSHIFT                          Status: Active      Target:         


Protocol:                                                                   








Activity Type Activity Date Activity User E-Sign Co-Sign Detail





Recorded Client Recorded Date Recorded By   


 


Document 18 01:10 [id number]   





PMRU-C03 18 01:10 [id number]   














  18





  01:10


 


PMRU Outcome: Nutrition/Swallowing 


 


Outcome/Goals Demonstrates





 Adequate





 Hydration/





 Prevents





 Dehydration





 Maintain/





 Improve





 Nutritional





 Status


 


Progression Toward Outcome/Goals Progressing








Safety- Improve/Maintain                Start:  18 02:06              


Freq:   QSHIFT                          Status: Active      Target:         


Protocol:                                                                   








Activity Type Activity Date Activity User E-Sign Co-Sign Detail





Recorded Client Recorded Date Recorded By   


 


Document 18 01:10 [id number]   





PMRU-C03 18 01:10 [id number]   














  18





  01:10


 


PMRU Outcome: Safety 


 


Outcome/Goals Remain Free of





 Injury or Harm





 Cooperates with





 Safety





 Measures for





 Least





 Restrictive





 Environment





 Prevent Falls/





 Injury


 


Progression Toward Outcome/Goals Progressing


 


Outcome/Goals Met Comment PA in place








Skin- Improve/Maintain                  Start:  18 02:06              


Freq:   QSHIFT                          Status: Active      Target:         


Protocol:                                                                   








Activity Type Activity Date Activity User E-Sign Co-Sign Detail





Recorded Client Recorded Date Recorded By   


 


Document 18 01:10 [id number]   





PMRU-C03 18 01:10 [id number]   














  18





  01:10


 


PMRU Outcome: Skin 


 


Skin Risk Level High


 


Skin Orders Dressing Change





 Air Mattress





 Spenco Boots





 Multipodus Boot





 Heels Off Bed





 Turn/Position





 q2hr While in





 Bed


 


Outcome/Goals Maintain/





 Improve Skin





 Intergrity





 Maintain/





 Improve Wound





 Status


 


Progression Toward Outcome/Goals Progressing


 


Outcome/Goals Met Comment multipodus boot





 to R foot,





 spanko to L





 foot














Medicine Note: 








Length of Stay:  3 weeks





Anticipated Discharge Destination: Home





Tentative Discharge Date:  18





Discharged to:  TBD

## 2018-03-14 LAB
BASOPHILS # BLD AUTO: 0.1 10^3/UL (ref 0–0.2)
EOSINOPHIL # BLD AUTO: 0.3 10^3/UL (ref 0–0.6)
HCT VFR BLD AUTO: 39 % (ref 35–47)
HGB BLD-MCNC: 13.2 G/DL (ref 12–16)
LYMPHOCYTES # BLD AUTO: 3 10^3/UL (ref 1–4.8)
MCH RBC QN AUTO: 29 PG (ref 27–31)
MCHC RBC AUTO-ENTMCNC: 34 G/DL (ref 31–36)
MCV RBC AUTO: 84 FL (ref 80–97)
MONOCYTES # BLD AUTO: 1.2 10^3/UL (ref 0–0.8)
NEUTROPHILS # BLD AUTO: 5.2 10^3/UL (ref 1.5–7.7)
NRBC # BLD AUTO: 0 10^3/UL
NRBC BLD QL AUTO: 0.1
PLATELET # BLD AUTO: 245 10^3/UL (ref 150–450)
RBC # BLD AUTO: 4.62 10^6/UL (ref 4–5.4)
WBC # BLD AUTO: 9.7 10^3/UL (ref 3.5–10.8)

## 2018-03-14 RX ADMIN — FLUOXETINE SCH MG: 20 CAPSULE ORAL at 09:27

## 2018-03-14 RX ADMIN — METOPROLOL SUCCINATE SCH MG: 50 TABLET, EXTENDED RELEASE ORAL at 09:27

## 2018-03-14 RX ADMIN — DABIGATRAN ETEXILATE MESYLATE SCH MG: 150 CAPSULE ORAL at 19:18

## 2018-03-14 RX ADMIN — Medication SCH CAP: at 09:27

## 2018-03-14 RX ADMIN — DABIGATRAN ETEXILATE MESYLATE SCH MG: 150 CAPSULE ORAL at 09:27

## 2018-03-14 RX ADMIN — Medication SCH CAP: at 19:18

## 2018-03-14 RX ADMIN — LOSARTAN POTASSIUM SCH MG: 25 TABLET, FILM COATED ORAL at 09:26

## 2018-03-14 RX ADMIN — ATORVASTATIN CALCIUM SCH MG: 80 TABLET, FILM COATED ORAL at 16:33

## 2018-03-14 NOTE — PN
Progress Note


Date of Service: 03/14/18


Note: 


NUNU PRYOR was visited. Therapy notes read and reviewed. Her diarrhea has 

finally stopped, and she had a formed BM. Otherwise doing ok 








Current Medications: 


 Active Medications











Generic Name Dose Route Start Last Admin





  Trade Name Freq  PRN Reason Stop Dose Admin


 


Acetaminophen  650 mg  02/20/18 13:28  





  Tylenol Tab*  PO   





  Q6H PRN   





  FEVER/PAIN   


 


Atorvastatin Calcium  80 mg  02/21/18 17:00  03/14/18 16:33





  Lipitor*  PO   80 mg





  1700 IGOR   Administration


 


Dabigatran  150 mg  02/20/18 21:00  03/14/18 19:18





  Pradaxa Cap(Nf)  PO   150 mg





  BID IGOR   Administration


 


Docusate Sodium  100 mg  02/24/18 09:22  





  Colace Cap*  PO   





  BID PRN   





  CONSTIPATION   


 


Fluoxetine HCl  20 mg  02/26/18 17:54  03/14/18 09:27





  Prozac Cap*  PO   20 mg





  DAILY IGOR   Administration


 


Hydralazine HCl  5 mg  02/24/18 16:08  03/10/18 07:16





  Apresoline Tab*  PO   5 mg





  Q6H PRN   Administration





  SBP >180   


 


Lactobacillus Rhamnosus  1 cap  02/22/18 21:00  03/14/18 19:18





  Culturelle*  PO   1 cap





  BID IGOR   Administration


 


Losartan Potassium  100 mg  02/21/18 09:00  03/14/18 09:26





  Cozaar Tab*  PO   100 mg





  DAILY IGOR   Administration


 


Magnesium Hydroxide  30 ml  02/20/18 13:28  





  Milk Of Magnesia Liq*  PO   





  Q6H PRN   





  CONSTIPATION   


 


Metoprolol Succinate  50 mg  03/05/18 11:17  03/14/18 09:27





  Toprol Xl Tab*  PO   50 mg





  DAILY IGOR   Administration


 


Senna  2 tab  02/20/18 13:28  





  Senokot Tab*  PO   





  BEDTIME PRN   





  CONSTIPATION   











Vital Signs: 


 Vital Signs











Temp Pulse Resp BP Pulse Ox


 


 97.5 F   50   18   167/72   100 


 


 03/14/18 16:08  03/14/18 16:08  03/14/18 17:57  03/14/18 16:08  03/14/18 17:57











Lab Results: 


 Laboratory Results - last 24 hr











  03/14/18 03/14/18





  06:40 06:40


 


WBC  9.7 


 


RBC  4.62 


 


Hgb  13.2 


 


Hct  39 


 


MCV  84 


 


MCH  29 


 


MCHC  34 


 


RDW  15 


 


Plt Count  245 


 


MPV  9 


 


Neut % (Auto)  53.1 


 


Lymph % (Auto)  30.4 


 


Mono % (Auto)  12.4 H 


 


Eos % (Auto)  3.0 


 


Baso % (Auto)  1.1 


 


Absolute Neuts (auto)  5.2 


 


Absolute Lymphs (auto)  3.0 


 


Absolute Monos (auto)  1.2 H 


 


Absolute Eos (auto)  0.3 


 


Absolute Basos (auto)  0.1 


 


Absolute Nucleated RBC  0 


 


Nucleated RBC %  0.1 


 


Sodium   132 L


 


Potassium   3.3 L


 


Chloride   101


 


Carbon Dioxide   25


 


Anion Gap   6


 


BUN   16


 


Creatinine   0.63


 


Est GFR ( Amer)   117.2


 


Est GFR (Non-Af Amer)   91.2


 


BUN/Creatinine Ratio   25.4 H


 


Glucose   91


 


Calcium   8.9


 


Total Bilirubin   0.60


 


AST   17


 


ALT   16


 


Alkaline Phosphatase   97


 


Total Protein   5.9 L


 


Albumin   3.1 L


 


Globulin   2.8


 


Albumin/Globulin Ratio   1.1











Exam: 





HEENT: L facial droop.


LUNGS: Clear bilaterally


HEART: Regular rate and rhythm


ABDOMEN: Soft, + BS, non-tender, non-distended


EXTREMITIES: No edema.


NEUROLOGIC: Some shoulder shrug on right


Assessment/Plan: 





1. Left CVA, right hemiplegia: PT/OT/SLP. Pradaxa/Lipitor. Prozac 20 mg.


2. Dysphagia: Mech Ground Texture, thin liquids.  


3. Right heel ulcer: mepilex dressings.  Multipodus boot with hourly checks and 

adjustments scheduled.


4. P. Atrial Fibrillation: Pradaxa/Lopressor


5. Hypertension: Cozaar and Lopressor.   prn hydralazine. 


6. Hyperglycemia: Hgb A1c mildly elevated.  Will follow


7. Hypomagnesemia: May need IV magnesium. Mg still low will see as her diet and 

intake improve if her Mg will rise


8. Advanced Directives: Has MOLST; DNR


9. Diarrhea: Slowing, had a semi solid BM. 











03/14/18 20:21

## 2018-03-15 RX ADMIN — LOSARTAN POTASSIUM SCH MG: 25 TABLET, FILM COATED ORAL at 09:38

## 2018-03-15 RX ADMIN — DABIGATRAN ETEXILATE MESYLATE SCH MG: 150 CAPSULE ORAL at 20:40

## 2018-03-15 RX ADMIN — FLUOXETINE SCH MG: 20 CAPSULE ORAL at 09:38

## 2018-03-15 RX ADMIN — DABIGATRAN ETEXILATE MESYLATE SCH MG: 150 CAPSULE ORAL at 09:38

## 2018-03-15 RX ADMIN — METOPROLOL SUCCINATE SCH MG: 50 TABLET, EXTENDED RELEASE ORAL at 09:38

## 2018-03-15 RX ADMIN — Medication SCH CAP: at 20:41

## 2018-03-15 RX ADMIN — Medication SCH CAP: at 09:38

## 2018-03-15 RX ADMIN — ATORVASTATIN CALCIUM SCH MG: 80 TABLET, FILM COATED ORAL at 17:30

## 2018-03-15 NOTE — PN
Progress Note


Date of Service: 03/15/18


Note: 


NUNU PRYOR was visited. Therapy notes read and reviewed. She feels good, 

and is enjoying eating. She otherwise is doing ok.








Current Medications: 


 Active Medications











Generic Name Dose Route Start Last Admin





  Trade Name Freq  PRN Reason Stop Dose Admin


 


Acetaminophen  650 mg  02/20/18 13:28  





  Tylenol Tab*  PO   





  Q6H PRN   





  FEVER/PAIN   


 


Atorvastatin Calcium  80 mg  02/21/18 17:00  03/15/18 17:30





  Lipitor*  PO   80 mg





  1700 IGOR   Administration


 


Dabigatran  150 mg  02/20/18 21:00  03/15/18 20:40





  Pradaxa Cap(Nf)  PO   150 mg





  BID IGOR   Administration


 


Docusate Sodium  100 mg  02/24/18 09:22  





  Colace Cap*  PO   





  BID PRN   





  CONSTIPATION   


 


Fluoxetine HCl  20 mg  02/26/18 17:54  03/15/18 09:38





  Prozac Cap*  PO   20 mg





  DAILY IGOR   Administration


 


Hydralazine HCl  5 mg  02/24/18 16:08  03/10/18 07:16





  Apresoline Tab*  PO   5 mg





  Q6H PRN   Administration





  SBP >180   


 


Lactobacillus Rhamnosus  1 cap  02/22/18 21:00  03/15/18 20:41





  Culturelle*  PO   1 cap





  BID IGOR   Administration


 


Losartan Potassium  100 mg  02/21/18 09:00  03/15/18 09:38





  Cozaar Tab*  PO   100 mg





  DAILY IGOR   Administration


 


Magnesium Hydroxide  30 ml  02/20/18 13:28  





  Milk Of Magnesia Liq*  PO   





  Q6H PRN   





  CONSTIPATION   


 


Metoprolol Succinate  50 mg  03/05/18 11:17  03/15/18 09:38





  Toprol Xl Tab*  PO   50 mg





  DAILY IGOR   Administration


 


Senna  2 tab  02/20/18 13:28  





  Senokot Tab*  PO   





  BEDTIME PRN   





  CONSTIPATION   











Vital Signs: 


 Vital Signs











Temp Pulse Resp BP Pulse Ox


 


 97.8 F   48   20   160/59   97 


 


 03/15/18 17:29  03/15/18 17:29  03/15/18 18:10  03/15/18 17:29  03/15/18 17:29











Exam: 





HEENT: L facial droop.


LUNGS: Clear bilaterally


HEART: Regular rate and rhythm


ABDOMEN: Soft, + BS, non-tender, non-distended


EXTREMITIES: No edema.


NEUROLOGIC: Some hip flexion and shoulder on right


Assessment/Plan: 





1. Left CVA, right hemiplegia: PT/OT/SLP. Pradaxa/Lipitor. Prozac 20 mg.


2. Dysphagia: Mech Ground Texture, thin liquids.  


3. Right heel ulcer: mepilex dressings.  Multipodus boot with hourly checks and 

adjustments scheduled.


4. P. Atrial Fibrillation: Pradaxa/Lopressor


5. Hypertension: Cozaar and Lopressor.   prn hydralazine. 


6. Hyperglycemia: Hgb A1c mildly elevated.  Will follow


7. Hypomagnesemia: May need IV magnesium. Mg still low; will see as her diet 

and intake improve if her Mg will rise


8. Advanced Directives: Has MOLST; DNR


9. Diarrhea: Stopped, had a solid BM. 














03/15/18 21:53

## 2018-03-16 RX ADMIN — LOSARTAN POTASSIUM SCH MG: 25 TABLET, FILM COATED ORAL at 08:38

## 2018-03-16 RX ADMIN — Medication SCH CAP: at 19:54

## 2018-03-16 RX ADMIN — DABIGATRAN ETEXILATE MESYLATE SCH MG: 150 CAPSULE ORAL at 08:37

## 2018-03-16 RX ADMIN — METOPROLOL SUCCINATE SCH MG: 50 TABLET, EXTENDED RELEASE ORAL at 08:39

## 2018-03-16 RX ADMIN — Medication SCH CAP: at 08:38

## 2018-03-16 RX ADMIN — FLUOXETINE SCH MG: 20 CAPSULE ORAL at 08:37

## 2018-03-16 RX ADMIN — ATORVASTATIN CALCIUM SCH MG: 80 TABLET, FILM COATED ORAL at 16:00

## 2018-03-16 RX ADMIN — DABIGATRAN ETEXILATE MESYLATE SCH MG: 150 CAPSULE ORAL at 19:54

## 2018-03-16 NOTE — PN
Progress Note


Date of Service: 03/16/18


Note: 


NUNU PRYOR was visited. Nursing and therapy notes read and reviewed. No 

chest pain, shortness of breath or abdominal pain.  She is happier with her 

current diet and eating better.  She denies any pain. Has isotoner glove on 

right hand.








Current Medications: 


 Active Medications











Generic Name Dose Route Start Last Admin





  Trade Name Freq  PRN Reason Stop Dose Admin


 


Acetaminophen  650 mg  02/20/18 13:28  





  Tylenol Tab*  PO   





  Q6H PRN   





  FEVER/PAIN   


 


Atorvastatin Calcium  80 mg  02/21/18 17:00  03/15/18 17:30





  Lipitor*  PO   80 mg





  1700 IGOR   Administration


 


Dabigatran  150 mg  02/20/18 21:00  03/16/18 08:37





  Pradaxa Cap(Nf)  PO   150 mg





  BID IGOR   Administration


 


Docusate Sodium  100 mg  02/24/18 09:22  





  Colace Cap*  PO   





  BID PRN   





  CONSTIPATION   


 


Fluoxetine HCl  20 mg  02/26/18 17:54  03/16/18 08:37





  Prozac Cap*  PO   20 mg





  DAILY IGOR   Administration


 


Hydralazine HCl  5 mg  02/24/18 16:08  03/10/18 07:16





  Apresoline Tab*  PO   5 mg





  Q6H PRN   Administration





  SBP >180   


 


Lactobacillus Rhamnosus  1 cap  02/22/18 21:00  03/16/18 08:38





  Culturelle*  PO   1 cap





  BID IGOR   Administration


 


Losartan Potassium  100 mg  02/21/18 09:00  03/16/18 08:38





  Cozaar Tab*  PO   100 mg





  DAILY IGOR   Administration


 


Magnesium Hydroxide  30 ml  02/20/18 13:28  





  Milk Of Magnesia Liq*  PO   





  Q6H PRN   





  CONSTIPATION   


 


Metoprolol Succinate  50 mg  03/05/18 11:17  03/16/18 08:39





  Toprol Xl Tab*  PO   50 mg





  DAILY IGOR   Administration


 


Potassium Chloride  20 meq  03/16/18 09:00  03/16/18 08:40





  Klor Con Er Tab*  PO   20 meq





  BID IGOR   Administration


 


Senna  2 tab  02/20/18 13:28  





  Senokot Tab*  PO   





  BEDTIME PRN   





  CONSTIPATION   











Vital Signs: 


 Vital Signs











Temp Pulse Resp BP Pulse Ox


 


 98.1 F   60   18   165/80   94 


 


 03/16/18 05:32  03/16/18 05:32  03/16/18 05:32  03/16/18 05:32  03/16/18 05:32











Lab Results: 


 Laboratory Results - last 24 hr











  03/16/18





  10:22


 


Sodium  134


 


Potassium  3.9


 


Chloride  100 L


 


Carbon Dioxide  28


 


Anion Gap  6


 


BUN  20


 


Creatinine  0.65


 


Est GFR ( Amer)  113.1


 


Est GFR (Non-Af Amer)  87.9


 


BUN/Creatinine Ratio  30.8 H


 


Glucose  101 H


 


Calcium  9.4


 


Magnesium  1.5 L











Exam: 





GEN: No acute distress. Alert and appropriate.


HEENT: L facial droop.


LUNGS: Clear bilaterally


HEART: Regular rate and rhythm


ABDOMEN: Soft, + BS, non-tender, non-distended


NEUROLOGIC: CN VII palsy. trace right shoulder shrug and movement of her right 

hip and great toe. 


EXTREMITIES: Right leg ACE wrapped with dressing on.





Assessment/Plan: 





78yo woman s/p CVA with right hemiplegia, dysphagia, dysarthria, and cognitive 

impairment.





1. Left CVA, right hemiplegia: PT/OT/SLP. Pradaxa/Lipitor. Prozac 20 mg.


2. Dysphagia: Mech Ground Texture, thin liquids.  


3. Right heel ulcer: mepilex dressings.  Multipodus boot with hourly checks and 

adjustments scheduled.


4. P. Atrial Fibrillation: Pradaxa/Lopressor


5. Hypertension: Cozaar and Lopressor. prn hydralazine. May need increase 

lopressor.


6. Hyperglycemia: Hgb A1c mildly elevated.  Will follow


7. Hypomagnesemia/Hypokalemia:  Mg still low, but stable. Will see as her diet 

and intake improve if her Mg will rise. I d/w her IV Magnesium sulfate. She has 

concern of her sulfa allergy from an antibiotic and does not want to take 

Magnesium oxide since had diarrhea, which has resolved. Will keep on a 20meq 

KCL daily. 


8. Advanced Directives: Has MOLST; DNR


9. Diarrhea: Stopped, had a solid BM. 





03/16/18 11:52

## 2018-03-17 RX ADMIN — FLUOXETINE SCH MG: 20 CAPSULE ORAL at 09:30

## 2018-03-17 RX ADMIN — METOPROLOL SUCCINATE SCH MG: 50 TABLET, EXTENDED RELEASE ORAL at 09:30

## 2018-03-17 RX ADMIN — DABIGATRAN ETEXILATE MESYLATE SCH MG: 150 CAPSULE ORAL at 19:59

## 2018-03-17 RX ADMIN — ATORVASTATIN CALCIUM SCH MG: 80 TABLET, FILM COATED ORAL at 17:26

## 2018-03-17 RX ADMIN — AMLODIPINE BESYLATE SCH MG: 5 TABLET ORAL at 11:29

## 2018-03-17 RX ADMIN — LOSARTAN POTASSIUM SCH MG: 25 TABLET, FILM COATED ORAL at 09:30

## 2018-03-17 RX ADMIN — DABIGATRAN ETEXILATE MESYLATE SCH MG: 150 CAPSULE ORAL at 09:30

## 2018-03-17 RX ADMIN — Medication SCH CAP: at 09:30

## 2018-03-17 RX ADMIN — POTASSIUM CHLORIDE SCH MEQ: 1500 TABLET, EXTENDED RELEASE ORAL at 09:31

## 2018-03-17 RX ADMIN — Medication SCH CAP: at 19:59

## 2018-03-17 NOTE — PN
Progress Note


Date of Service: 03/17/18


Note: 


NUNU PRYOR was visited. Nursing and therapy notes read and reviewed.  No 

chest pain, shortness or breath or abdominal pain.  Using isotoner glove on 

right hand to decrease swelling. PT requests knee immobilizer to use in PT 

sessions for standing.  








Current Medications: 


 Active Medications











Generic Name Dose Route Start Last Admin





  Trade Name Freq  PRN Reason Stop Dose Admin


 


Acetaminophen  650 mg  02/20/18 13:28  





  Tylenol Tab*  PO   





  Q6H PRN   





  FEVER/PAIN   


 


Amlodipine Besylate  5 mg  03/17/18 10:00  





  Norvasc Tab*  PO   





  DAILY IGOR   


 


Atorvastatin Calcium  80 mg  02/21/18 17:00  03/16/18 16:00





  Lipitor*  PO   80 mg





  1700 IGOR   Administration


 


Dabigatran  150 mg  02/20/18 21:00  03/17/18 09:30





  Pradaxa Cap(Nf)  PO   150 mg





  BID IGOR   Administration


 


Docusate Sodium  100 mg  02/24/18 09:22  





  Colace Cap*  PO   





  BID PRN   





  CONSTIPATION   


 


Fluoxetine HCl  20 mg  02/26/18 17:54  03/17/18 09:30





  Prozac Cap*  PO   20 mg





  DAILY IGOR   Administration


 


Hydralazine HCl  5 mg  02/24/18 16:08  03/10/18 07:16





  Apresoline Tab*  PO   5 mg





  Q6H PRN   Administration





  SBP >180   


 


Lactobacillus Rhamnosus  1 cap  02/22/18 21:00  03/17/18 09:30





  Culturelle*  PO   1 cap





  BID IGOR   Administration


 


Losartan Potassium  100 mg  02/21/18 09:00  03/17/18 09:30





  Cozaar Tab*  PO   100 mg





  DAILY IGOR   Administration


 


Magnesium Hydroxide  30 ml  02/20/18 13:28  





  Milk Of Magnesia Liq*  PO   





  Q6H PRN   





  CONSTIPATION   


 


Metoprolol Succinate  50 mg  03/05/18 11:17  03/17/18 09:30





  Toprol Xl Tab*  PO   50 mg





  DAILY IGOR   Administration


 


Potassium Chloride  20 meq  03/17/18 09:00  03/17/18 09:31





  Klor Con Er Tab*  PO   20 meq





  DAILY IGOR   Administration


 


Senna  2 tab  02/20/18 13:28  





  Senokot Tab*  PO   





  BEDTIME PRN   





  CONSTIPATION   











Vital Signs: 


 Vital Signs











Temp Pulse Resp BP Pulse Ox


 


 97.7 F   51   20   172/74   96 


 


 03/17/18 06:17  03/17/18 06:17  03/17/18 06:17  03/17/18 06:31  03/17/18 06:17











Lab Results: 


 Laboratory Results - last 24 hr











  03/16/18





  10:22


 


Sodium  134


 


Potassium  3.9


 


Chloride  100 L


 


Carbon Dioxide  28


 


Anion Gap  6


 


BUN  20


 


Creatinine  0.65


 


Est GFR ( Amer)  113.1


 


Est GFR (Non-Af Amer)  87.9


 


BUN/Creatinine Ratio  30.8 H


 


Glucose  101 H


 


Calcium  9.4


 


Magnesium  1.5 L











Exam: 





GEN: No acute distress. Alert and appropriate.


HEENT: L facial droop.


LUNGS: Clear bilaterally


HEART: Regular rate and rhythm


ABDOMEN: Soft, + BS, non-tender, non-distended


NEUROLOGIC: CN VII palsy. trace right shoulder shrug and movement of her right 

hip.  Right hip adduction seems strongest. 


EXTREMITIES: Right heel sore now nickel-sized and much smaller than when I last 

saw.  Good granulation tissue and clean.  No edema.


Assessment/Plan: 





78yo woman s/p CVA with right hemiplegia, dysphagia, dysarthria, and cognitive 

impairment.





1. Left CVA, right hemiplegia: PT/OT/SLP. Pradaxa/Lipitor. Prozac 20 mg.


2. Dysphagia: Mech Ground Texture, thin liquids.  


3. Right heel ulcer: mepilex dressings.  Multipodus boot with hourly checks and 

adjustments scheduled.


4. P. Atrial Fibrillation: Pradaxa/Lopressor


5. Hypertension: Cozaar and Lopressor. prn hydralazine. Add amlodipine 5mg qday.


6. Hyperglycemia: Hgb A1c mildly elevated.  Will follow


7. Hypomagnesemia/Hypokalemia:  Mg still low, but stable. Will see as her diet 

and intake improve if her Mg will rise. I d/w her on 3/16 IV Magnesium sulfate. 

She has concern of her sulfa allergy (likely sulfonamide and reaction was rash) 

from an antibiotic and does not want to take Magnesium oxide since had diarrhea

, which has resolved. Will keep on a 20meq KCL daily. f/u labs.


8. Advanced Directives: Has MOLST; DNR


9. Diarrhea: Stopped, had a solid BM. 


10.  Dispo: Per  insurance will not extend acute rehab and will need subacute 

setting.








03/17/18 10:32

## 2018-03-18 RX ADMIN — ATORVASTATIN CALCIUM SCH MG: 80 TABLET, FILM COATED ORAL at 17:13

## 2018-03-18 RX ADMIN — LOSARTAN POTASSIUM SCH MG: 25 TABLET, FILM COATED ORAL at 09:13

## 2018-03-18 RX ADMIN — DABIGATRAN ETEXILATE MESYLATE SCH MG: 150 CAPSULE ORAL at 09:13

## 2018-03-18 RX ADMIN — METOPROLOL SUCCINATE SCH MG: 50 TABLET, EXTENDED RELEASE ORAL at 09:13

## 2018-03-18 RX ADMIN — Medication SCH CAP: at 09:13

## 2018-03-18 RX ADMIN — Medication SCH CAP: at 21:43

## 2018-03-18 RX ADMIN — DABIGATRAN ETEXILATE MESYLATE SCH MG: 150 CAPSULE ORAL at 21:43

## 2018-03-18 RX ADMIN — AMLODIPINE BESYLATE SCH MG: 5 TABLET ORAL at 09:13

## 2018-03-18 RX ADMIN — POTASSIUM CHLORIDE SCH MEQ: 1500 TABLET, EXTENDED RELEASE ORAL at 09:13

## 2018-03-18 RX ADMIN — FLUOXETINE SCH MG: 20 CAPSULE ORAL at 09:13

## 2018-03-18 NOTE — PN
Progress Note


Date of Service: 03/18/18


Note: 


NUNU PRYOR was visited. Nursing notes read and reviewed. No chest pain, 

shortness of breath or abdominal pain. She thinks right hand glove is helping 

with her hand swelling.  Tolerating addition of norvasc.








Current Medications: 


 Active Medications











Generic Name Dose Route Start Last Admin





  Trade Name Freq  PRN Reason Stop Dose Admin


 


Acetaminophen  650 mg  02/20/18 13:28  





  Tylenol Tab*  PO   





  Q6H PRN   





  FEVER/PAIN   


 


Amlodipine Besylate  5 mg  03/17/18 10:00  03/18/18 09:13





  Norvasc Tab*  PO   5 mg





  DAILY IGOR   Administration


 


Atorvastatin Calcium  80 mg  02/21/18 17:00  03/17/18 17:26





  Lipitor*  PO   80 mg





  1700 IGOR   Administration


 


Dabigatran  150 mg  02/20/18 21:00  03/18/18 09:13





  Pradaxa Cap(Nf)  PO   150 mg





  BID IGOR   Administration


 


Docusate Sodium  100 mg  02/24/18 09:22  





  Colace Cap*  PO   





  BID PRN   





  CONSTIPATION   


 


Fluoxetine HCl  20 mg  02/26/18 17:54  03/18/18 09:13





  Prozac Cap*  PO   20 mg





  DAILY IGOR   Administration


 


Hydralazine HCl  5 mg  02/24/18 16:08  03/10/18 07:16





  Apresoline Tab*  PO   5 mg





  Q6H PRN   Administration





  SBP >180   


 


Lactobacillus Rhamnosus  1 cap  02/22/18 21:00  03/18/18 09:13





  Culturelle*  PO   1 cap





  BID IGOR   Administration


 


Losartan Potassium  100 mg  02/21/18 09:00  03/18/18 09:13





  Cozaar Tab*  PO   100 mg





  DAILY IGOR   Administration


 


Magnesium Hydroxide  30 ml  02/20/18 13:28  





  Milk Of Magnesia Liq*  PO   





  Q6H PRN   





  CONSTIPATION   


 


Metoprolol Succinate  50 mg  03/05/18 11:17  03/18/18 09:13





  Toprol Xl Tab*  PO   50 mg





  DAILY IGOR   Administration


 


Potassium Chloride  20 meq  03/17/18 09:00  03/18/18 09:13





  Klor Con Er Tab*  PO   20 meq





  DAILY IGOR   Administration


 


Senna  2 tab  02/20/18 13:28  





  Senokot Tab*  PO   





  BEDTIME PRN   





  CONSTIPATION   











Vital Signs: 


 


 Vital Signs











  03/17/18 03/17/18 03/17/18





  13:12 15:34 17:53


 


Temperature  98.9 F 


 


Pulse Rate  51 


 


Respiratory  16 





Rate   


 


Blood Pressure 140/82 168/73 120/78





(mmHg)   


 


O2 Sat by Pulse  97 





Oximetry   














  03/18/18





  06:53


 


Temperature 100.0 F


 


Pulse Rate 52


 


Respiratory 20





Rate 


 


Blood Pressure 170/80





(mmHg) 


 


O2 Sat by Pulse 96





Oximetry 











Exam: 





GEN: No acute distress. Alert and appropriate.


HEENT: L facial droop.


LUNGS: Clear bilaterally


HEART: Regular rate and rhythm


ABDOMEN: Soft, + BS, non-tender, non-distended


NEUROLOGIC: CN VII palsy. trace right shoulder shrug and movement of her right 

hip.  Right hip adduction seems strongest. 


EXTREMITIES: Right heel dressed.  No edema.


Assessment/Plan: 





76yo woman s/p CVA with right hemiplegia, dysphagia, dysarthria, and cognitive 

impairment.





1. Left CVA, right hemiplegia: PT/OT/SLP. Pradaxa/Lipitor. Prozac 20 mg.


2. Dysphagia: Mech Ground Texture, thin liquids.  


3. Right heel ulcer: mepilex dressings.  Multipodus boot with hourly checks and 

adjustments scheduled.


4. P. Atrial Fibrillation: Pradaxa/Lopressor


5. Hypertension: Cozaar and Lopressor. prn hydralazine. Added amlodipine 5mg 

qday on 3/17/18.  Follow VS.


6. Hyperglycemia: Hgb A1c mildly elevated.  f/u as outpatient.


7. Hypomagnesemia/Hypokalemia:  Mg still low, but stable. Will see as her diet 

and intake improve if her Mg will rise. D/w her on 3/16 IV Magnesium sulfate. 

She has concern of her sulfa allergy (likely sulfonamide and reaction was rash) 

from an antibiotic and does not want to take Magnesium oxide since had diarrhea

, which has resolved. Will keep on a 20meq KCL daily. f/u labs.


8. Advanced Directives: Has MOLST; DNR


9. Diarrhea: Stopped, had a solid BM. 


10.  Dispo: Per  insurance will not extend acute rehab and will need subacute 

setting.





03/18/18 10:29

## 2018-03-19 RX ADMIN — METOPROLOL SUCCINATE SCH MG: 50 TABLET, EXTENDED RELEASE ORAL at 09:02

## 2018-03-19 RX ADMIN — LOSARTAN POTASSIUM SCH MG: 25 TABLET, FILM COATED ORAL at 09:01

## 2018-03-19 RX ADMIN — FLUOXETINE SCH MG: 20 CAPSULE ORAL at 09:01

## 2018-03-19 RX ADMIN — DABIGATRAN ETEXILATE MESYLATE SCH MG: 150 CAPSULE ORAL at 20:13

## 2018-03-19 RX ADMIN — Medication SCH CAP: at 20:13

## 2018-03-19 RX ADMIN — Medication SCH CAP: at 09:01

## 2018-03-19 RX ADMIN — POTASSIUM CHLORIDE SCH MEQ: 1500 TABLET, EXTENDED RELEASE ORAL at 09:02

## 2018-03-19 RX ADMIN — DABIGATRAN ETEXILATE MESYLATE SCH MG: 150 CAPSULE ORAL at 08:58

## 2018-03-19 RX ADMIN — ATORVASTATIN CALCIUM SCH MG: 80 TABLET, FILM COATED ORAL at 16:19

## 2018-03-19 RX ADMIN — AMLODIPINE BESYLATE SCH MG: 5 TABLET ORAL at 08:57

## 2018-03-19 NOTE — PN
Progress Note


Date of Service: 03/19/18


Note: 


NUNU PRYOR was visited. Therapy notes read and reviewed. She has a bed 

offer at [facility] in [place] and will likely 

go there tomorrow. She has some upper airway congestion which may be due to a 

viral syndrome.  








Current Medications: 


 Active Medications











Generic Name Dose Route Start Last Admin





  Trade Name Freq  PRN Reason Stop Dose Admin


 


Acetaminophen  650 mg  02/20/18 13:28  





  Tylenol Tab*  PO   





  Q6H PRN   





  FEVER/PAIN   


 


Amlodipine Besylate  5 mg  03/17/18 10:00  03/19/18 08:57





  Norvasc Tab*  PO   5 mg





  DAILY IGOR   Administration


 


Atorvastatin Calcium  80 mg  02/21/18 17:00  03/18/18 17:13





  Lipitor*  PO   80 mg





  1700 IGOR   Administration


 


Dabigatran  150 mg  02/20/18 21:00  03/19/18 08:58





  Pradaxa Cap(Nf)  PO   150 mg





  BID IGOR   Administration


 


Docusate Sodium  100 mg  02/24/18 09:22  





  Colace Cap*  PO   





  BID PRN   





  CONSTIPATION   


 


Fluoxetine HCl  20 mg  02/26/18 17:54  03/19/18 09:01





  Prozac Cap*  PO   20 mg





  DAILY IGOR   Administration


 


Hydralazine HCl  5 mg  02/24/18 16:08  03/10/18 07:16





  Apresoline Tab*  PO   5 mg





  Q6H PRN   Administration





  SBP >180   


 


Lactobacillus Rhamnosus  1 cap  02/22/18 21:00  03/19/18 09:01





  Culturelle*  PO   1 cap





  BID IGOR   Administration


 


Losartan Potassium  100 mg  02/21/18 09:00  03/19/18 09:01





  Cozaar Tab*  PO   100 mg





  DAILY IGOR   Administration


 


Magnesium Hydroxide  30 ml  02/20/18 13:28  





  Milk Of Magnesia Liq*  PO   





  Q6H PRN   





  CONSTIPATION   


 


Metoprolol Succinate  50 mg  03/05/18 11:17  03/19/18 09:02





  Toprol Xl Tab*  PO   50 mg





  DAILY IGOR   Administration


 


Potassium Chloride  20 meq  03/17/18 09:00  03/19/18 09:02





  Klor Con Er Tab*  PO   20 meq





  DAILY IGOR   Administration


 


Senna  2 tab  02/20/18 13:28  





  Senokot Tab*  PO   





  BEDTIME PRN   





  CONSTIPATION   











Vital Signs: 


 Vital Signs











Temp Pulse Resp BP Pulse Ox


 


 99.7 F   55   18   170/78   93 


 


 03/19/18 06:11  03/19/18 06:11  03/19/18 06:11  03/19/18 06:11  03/19/18 06:11











Exam: 








GEN: No acute distress. Alert and appropriate.


HEENT: L facial droop.


LUNGS: Clear bilaterally


HEART: Regular rate and rhythm


ABDOMEN: Soft, + BS, non-tender, non-distended


NEUROLOGIC: CN VII palsy. trace right shoulder shrug and movement of her right 

hip. 


Assessment/Plan: 





1. Left CVA, right hemiplegia: PT/OT/SLP. Pradaxa/Lipitor. Prozac 20 mg.


2. Dysphagia: Mech Ground Texture, thin liquids.  


3. Right heel ulcer: mepilex dressings.  Multipodus boot with hourly checks and 

adjustments scheduled.


4. P. Atrial Fibrillation: Pradaxa/Lopressor


5. Hypertension: Cozaar and Lopressor. prn hydralazine. Added amlodipine 5mg 

qday on 3/17/18.  Follow VS.


6. Hyperglycemia: Hgb A1c mildly elevated.  f/u as outpatient.


7. Hypomagnesemia/Hypokalemia:  Mg still low, but stable. Will see as her diet 

and intake improve if her Mg will rise. f/u labs.


8. Advanced Directives: Has MOLST; DNR


9. Diarrhea: Stopped, had a solid BM. 


10.  Dispo: To [facility] tomorrow











03/19/18 15:48

## 2018-03-20 VITALS — SYSTOLIC BLOOD PRESSURE: 161 MMHG | DIASTOLIC BLOOD PRESSURE: 66 MMHG

## 2018-03-20 RX ADMIN — DABIGATRAN ETEXILATE MESYLATE SCH MG: 150 CAPSULE ORAL at 08:06

## 2018-03-20 RX ADMIN — FLUOXETINE SCH MG: 20 CAPSULE ORAL at 08:06

## 2018-03-20 RX ADMIN — AMLODIPINE BESYLATE SCH MG: 5 TABLET ORAL at 08:06

## 2018-03-20 RX ADMIN — Medication SCH CAP: at 08:06

## 2018-03-20 RX ADMIN — LOSARTAN POTASSIUM SCH MG: 25 TABLET, FILM COATED ORAL at 08:07

## 2018-03-20 RX ADMIN — METOPROLOL SUCCINATE SCH MG: 50 TABLET, EXTENDED RELEASE ORAL at 08:07

## 2018-03-20 RX ADMIN — POTASSIUM CHLORIDE SCH MEQ: 1500 TABLET, EXTENDED RELEASE ORAL at 08:07

## 2018-03-20 NOTE — TRS
CC:  [facility] in [place], [place] *

 

TRANSFER SUMMARY:

 

DATE OF ADMISSION:  02/20/18

 

DATE OF DISCHARGE:  03/20/18

 

DISCHARGE DIAGNOSES:

1.  Left cerebrovascular accident with right hemiplegia.

2.  Paroxysmal atrial fibrillation.

3.  Hypertension.

4.  Right heel ulcer.

5.  Dysphagia as a result of her cerebrovascular accident.

6.  Clostridium difficile colitis.

7.  Chronic diarrhea.

 

HISTORY OF ILLNESS AND HOSPITAL COURSE:  For complete history of the events 
leading up to her rehab stay, please see the history and physical dictated by 
me on 02/20/18.  While on the rehab unit, the patient received an extensive 
treatment with oral vancomycin for her C. difficile colitis.  She also 
developed a urinary tract infection while on the rehab unit and this was 
treated with Cipro.  She had an Infectious Disease consult with Dr. Ahmet Salomon, who recommended extending her vancomycin treatment to 03/06/18.  Her 
vancomycin was stopped at that time. The patient had multiple medications, 
which might have contributed to her diarrhea and these were held or stopped.  
These included magnesium oxide, which she was taking for hypomagnesemia.  The 
patient's diarrhea eventually stopped.  She remained hypomagnesemic, but the 
patient did not wish to go back on magnesium oxide.  In an attempt to promote 
neuronal recovery, the patient was started on Prozac and remains on Prozac.  
The patient had difficulties with dysphagia.  She was seen by Speech Therapy 
while on the rehab unit.  Her diet was able to be upgraded to mechanical ground 
and thin liquids.  This was following a video fluoroscopy study that was done 
on 03/09/18.  The patient was seen by Speech Therapy as well as Physical 
Therapy and Occupational Therapy and made gains with all 3 disciplines.  As 
mentioned, her diet improved with speech therapy.  With physical therapy at the 
time of admission, the patient required max assist of 2 people to do a 
transfer. By the time of discharge, the patient was able to transfer with mod 
assist, still unable to ambulate.  With occupational therapy at the time of 
admission, the patient was total assistance for dressing, total assistance for 
toileting, and total assistance for toilet transfers.  By the time of discharge
, the patient was mod assist for upper body dressing, mod assist for lower body 
dressing, total assistance for toileting, and mod assist for toilet transfers.  
The patient is being transferred to the [facility] in order to 
undergo continued rehabilitation on a slower scale so that she may return to 
independent living.

 

DISCHARGE DIET:  Mechanical ground and thin liquids.  She was on a consistent 
carb diet as well.

 

DISCHARGE MEDICATIONS:  Included:

1.  Norvasc 5 mg daily.

2.  Lipitor 80 mg daily.

3.  Pradaxa 150 mg twice daily.

4.  Prozac 20 mg daily.

5.  Culturelle 1 tablet twice a day.

6.  Cozaar 100 mg daily.

7.  Toprol-XL 50 mg daily.

8.  Potassium chloride tablets 20 mEq daily.

 

SERVICES AFTER DISCHARGE:  She should have restorative physical therapy, 
occupational therapy, and speech therapy.

 

 [id number]/CPS #: [id number]

HARJEET

## 2024-09-30 NOTE — PMRUTEAM
PMRU: Goals


Current Status: 


 Nursing: Current Status











Skin Deviations [Right Lower   Abrasion





Leg]                           


 


Skin Deviations [Buttocks]     Other


 


Skin Deviations [Right Heel]   Wound


 


Skin Deviations [Left Heel]    Other


 


Skin Deviation Description [   abrasion x2, scabbed over





Right Lower Leg]               


 


Skin Deviation Description [   blanchable redness





Buttocks]                      


 


Skin Deviation Description [   mepelex





Right Heel]                    


 


Skin Deviation Description [   redness, elevated





Left Heel]                     


 


Wound Stage [Right Heel]       II











 Physical Therapy: Current Status











Bed Mobility Assistance        Mod Assist,Max Assist


 


Transfer Moblility Assistance  Mod Assist,Max Assist


 


Transfer/Bed Mobility          None





Recommended Devices            


 


Transfer Mobility Comment      mod to max A x 1 SPT


 


Ambulation Assistance          Unable


 


Ambulation Assistive Devices   Joey Walker


 


Ambulation Comment             Pt. is able to transition from sit to stand min 

to





 mod A x 1.


 


Stairs Assistance              Not Tested


 


Curb                           Not Tested














 Occupational Therapy: Current Status











Upper Body Dressing            Max Asst


 


Lower Body Dressing            Total Assist


 


Bathing                        Max Asst


 


Toileting                      Total Assist,2 Person Assist


 


Toilet Transfer                Mod Assist,Max Asst,2 Person Assist


 


Toilet Transfer Progress       mod-maxA x 1-2


 


Shower Transfer Progress       TBA


 


Eating                         Supervision,Min Assist


 


Eating Progress                occasional cues for right side of tray,





 significant increased time














 Rec Therapy: Current Status











Summary of Assessment and      RT assessment complete and pt. is aware of RT





Clinical Impression            services.  Pt. engages in word puzzles during 

free





 -time, will con't to provide activities.  Pt. has





 been pleasant and cooperative during visits.


 


Treatment Goals                Pt. will engage in recreation and leisure





 activities while on the unit.


 


Treatment Plan                 Provide RT services and encourage involvement.














 Social Work: Current Status











Discharge Plan                 return home with home care [facility] and family support


 


Potential for Family Training  pt's daughter and granddaughter are involved and





 attentive


 


Anticipated Discharge          Home





Destination                    


 


Discharge With                 home care svs and family support














 Nutrition: Current Status











Monitoring                     Pt reports that her appetite is "not good," and





 isn't noting any improvement. Intake appears





 adequate per intake records: % of meals. Pt





 does not like the pureed texture or thickened





 beverages; explained why pureed and nectar-thick





 texture ordered; pt seemed to understand. In





 addition to applesauce, pt does not like yogurt,





 which is being offered to her in the mornings by





 staff. Pt does like the Ensure Pudding with meals.





 Pt willing to have a strawberry milkshake with





 meals, so will send. Recommend liberalizing diet





 to [place] to allow a wider range of food options.





 Daily liquid BMs noted; pt is receiving Culturelle





 and continues on abx. Ensure adequate hydration





 to meet needs d/t losses.











 Speech: Current Status











Assessment                     Pt will continue to benefit from skilled speech





 pathology services.














Goals: 


 Physical Therapy: Initial Goals











Bed Mobility Assistance        Independent


 


Transfer Mobility Assistance   Independent


 


Transfer/Bed Mobility          Joey Walker





Recommended Devices            


 


Ambulation                     Independent


 


Ambulation Recommended Devices Joey Walker


 


Ambulation Distance            50


 


Stairs Assistance              Independent


 


Stair Recommended Devices      One Rail


 


Number of Stairs               5














 Physical Therapy: Updated Goals











Bed Mobility Assistance        Independent


 


Transfer Mobility Assistance   Independent


 


Transfer/Bed Mobility          Joey Walker





Recommended Devices            


 


Ambulation Assistance          Independent


 


Ambulation Assistive Devices   Joey Walker


 


Stairs Assistance              Independent


 


Stairs Recommended Devices     One Rail


 


Number of Stairs               5














 Occupational Therapy: Initial Goals











Goals to be Completed in (Days 4-6 weeks





)                              


 


Upper Body Bathing Routine     Modified Independent with


 


Lower Body Bathing Routine     Modified Independent with


 


Upper Body Dressing Routine    Modified Independent with


 


Lower Body Dressing Routine    Modified Independent with


 


Toilet Hygeine and Clothing    Modified Independent with





Management Routine             


 


Toilet Transfer Routine        Modified Independent with


 


Step-In Shower Transfer        Modified Independent with





Routine                        


 


Tub Transfer Routine           Modified Independent with


 


Functional Transfers for ADL   Modified Independent with


 


Grooming Routine               Modified Independent with


 


Feeding Routine                Modified Independent with














 Nutrition: Goals











Intervention Goals             1. pt will tolerate least-restrictive diet 

texture





 and liquid consistency without s/sx aspiration





 2. adequate po intake to maintain lean body mass





 and hydration; no clinical s/sx dehydration





 3. bowel pattern will be regulated; no c/o





 diarrhea (or constipation)





 4. Skin will show signs of healing without further





 breakdown











 Speech: Goals











Speech Goal 1                  Swallowing


 


Goal 1 Comments                Long-Term Goal: Pt will tolerate least 

restrictive





 diet consistencies with no clinical s/s or





 complications from aspiration.





 Short-Term Goal 1: Pt will tolerate pureed





 consistency and nectar liquids with no clinical s/





 s or complications from aspiration and adequate PO





 intake.





 Short-Term Goal 2: Pt will will tolerate thin





 water independently with minimal clinical s/s





 aspiration and demonstrate understanding and





 ability to follow precautions to tolerate free





 water protocol without complications from





 aspiration.





 Status:  Onoing. Pt tolerated adquate intake of





 pureed consistency, and reduced  intake of nectar





 liquids. Free water protocol has been initiated to





 maintain adequate hydration, to enable pt to





 safely practice swallowing techniques, and for





 improved quality of life.





 SLP provided skilled intraoral examination and





 care to remove oral residue after meal, and





 instructed pt to perform oral care with 75%





 accuracy given moderate cueing. Pt demonstrated





 manual apraxia ad corrected her oral care





 procedure after hand-over-hand instruction. Pt





 demonstrated precautions of small sips and chin





 tuck independently, and required minimal cueing to





 use head-turn to Left. SLP consulted and educated





 provider and nursing staff about free water





 protocol and precautions.





 


 


Speech Goal 2                  Problem Solving


 


Speech Goal 2 Comments         Long-Term Goal: Pt will solve functional daily





 problems independently using compensatory





 strategies as needed.





 Short-term goal:  Pt will attend to and recall





 orientation and her right-sided deficits, and





 demonstrate use of compensatory strategies to





 solve simple memory, feeding and self-care





 problems.





 Status:  Ongoing.





 SLP provided simple, enlarged printed word puzzles





 . Pt required hand-over-hand assistance to





 initiate writing with her unaffected nondominant





 Left hand, and maximal verbal and visual cueing to





 copy/spell target words. For simple word search/





 crossword, pt demonstrated reduced awareness of





 reading and writing errors given maximum skilled





 instuction and cueing, such as window-boxing





 target reading lines, and rereading her writing.














 Social Work: Goals











Discharge Plan                 return home with home care svs and family support


 


Potential for Family Training  pt's daughter and granddaughter are involved and





 attentive


 


Anticipated Discharge          Home





Destination                    


 


Discharge With                 home care svs and family support

















Care Plan: 


 Care Plan





ADL's - Improve/Maintain                Start:  02/20/18 15:29              


Freq:   QSHIFT                          Status: Active      Target:         


Protocol:                                                                   








Activity Type Activity Date Activity User E-Sign Co-Sign Detail





Recorded Client Recorded Date Recorded By   


 


Document 02/26/18 14:15 [id number]   





PMRU-C09 02/26/18 14:15 [id number]   














  02/26/18





  14:15


 


PMRU Outcome: ADL's/ADL Transfers 


 


Orders/Interventions Occupational





 Therapy





 Evaluation &





 Treatment





 Communication





 Tool in Patient





 Room


 


Address Deficits Secondary To: [place]


 


Patient to receive OT 5x/wk for  Therex





min/day Self Care





 Management





 Group Therapy





 Neuromuscular





 ReEducation


 


UE/LE ADL's with Assist Yes: Iraj


 


ADL Transfers with Assist Yes: Iraj


 


Toileting: Transfers,Clothing Management Yes: Iraj





,Hygeine w/Assist 


 


Light Kitchen/Laundry w/Assist Yes: Bernarda


 


Progression Toward Outcome/Goals Progressing


 


Outcome/Goals Met Pt participated





 well. Pt demo'





 d improved





 sensation





 assessment





 compated to on





 initial





 evaluation this





 date, also





 demonstrated





 greater ability





 to visually





 scan and attend





 to right side





 during course





 of ADL as well





 as looking at





 this writer on





 right side





 during





 treatment





 session with





 less cueing





 required.








Communication-Improve/Maintain          Start:  02/22/18 02:06              


Freq:   QSHIFT                          Status: Active      Target:         


Protocol:                                                                   








Activity Type Activity Date Activity User E-Sign Co-Sign Detail





Recorded Client Recorded Date Recorded By   


 


Document 02/26/18 23:48 [id number]   





PMRU-C03 02/26/18 23:48 [id number]   














  02/26/18





  23:48


 


PMRU Outcome: Communication/Cognitive 





Status 


 


Outcome/Goals Use Comm Tools/





 Devices





 Makes Needs





 Known





 Effectively


 


Other Outcomes/Goals Long-Term Goal:





 Pt will





 tolerate least





 restrictive





 diet





 consistencies





 with no





 clinical s/s or





 complications





 from aspiration





 .





 Long-Term Goal:





 Pt will solve





 functional





 daily problems





 independently





 using





 compensatory





 strategies as





 needed.





 Short-term goal





 :  Pt will





 attend to and





 recall





 orientation and





 her right-





 sided deficits,





 and





 demonstrate use





 of





 compensatory





 strategies to





 solve simple





 memory, feeding





 and self-care





 problems.





 Short-Term Goal





 : Pt will will





 tolerate pureed





 consistency





 and nectar





 thick liquids w





 /o clinical s/s





 or





 complications





 from aspiration





 Short-Term Goal





 : Pt will will





 tolerate thin





 water





 independently





 with minimal





 clinical s/s





 aspiration and





 demonstrate





 understanding





 and ability to





 follow





 precautions to





 tolerate free





 water protocol





 without





 complications





 from aspiration





 .


 


Progression Toward Outcomes/Goals Goals Adjusted








Coping/Psych-Improve/Maintain           Start:  02/22/18 02:06              


Freq:   QSHIFT                          Status: Active      Target:         


Protocol:                                                                   








Activity Type Activity Date Activity User E-Sign Co-Sign Detail





Recorded Client Recorded Date Recorded By   


 


Document 02/26/18 23:48 [id number]   





PMRU-C03 02/26/18 23:48 [id number]   














  02/26/18





  23:48


 


PMRU Outcome: Coping/Psychosocial 


 


Coping Outcome/Goals Verbalization





 of Acceptance





 of Rehab Admit





 Willingness to





 Participate in





 Treatment Plan





 and Basic Needs


 


Psychosocial Outcome/Goals Maintain/





 Improve





 Emotional





 Health





 Cooperate/





 Participate in





 Plan


 


Progression Toward Outcome/Goals - Progressing





Coping 


 


Progression Toward Outcome/Goals - Progressing





Psychosocial 








Discharge Planning - Improve/Maintain   Start:  02/22/18 02:06              


Freq:   QSHIFT                          Status: Active      Target:         


Protocol:                                                                   








Activity Type Activity Date Activity User E-Sign Co-Sign Detail





Recorded Client Recorded Date Recorded By   


 


Document 02/26/18 23:48 [id number]   





PMRU-C03 02/26/18 23:48 [id number]   














  02/26/18





  23:48


 


PMRU Outcome: Discharge Planning 


 


Identify Patient Needs yes


 


Update Patient Family No


 


Outcome/Goals Demonstrates





 Understanding





 of Discharge





 Plan


 


Progression Toward Outcome/Goals Progressing








Education-Improve/Maintain              Start:  02/22/18 02:06              


Freq:   QSHIFT                          Status: Active      Target:         


Protocol:                                                                   








Activity Type Activity Date Activity User E-Sign Co-Sign Detail





Recorded Client Recorded Date Recorded By   


 


Document 02/26/18 23:48 [id number]   





PMRU-C03 02/26/18 23:48 [id number]   














  02/26/18





  23:48


 


PMRU Outcome: Education 


 


Outcome/Goals Demonstrate/





 Verbalize





 Understanding





 of Written





 Discharge





 Instructions





 Demonstrates





 Skills





 Encourage





 Questions


 


Progression Toward Outcome/Goals Progressing








/GI-Improve/Maintain                  Start:  02/22/18 02:06              


Freq:   QSHIFT                          Status: Active      Target:         


Protocol:                                                                   








Activity Type Activity Date Activity User E-Sign Co-Sign Detail





Recorded Client Recorded Date Recorded By   


 


Document 02/26/18 23:48 [id number]   





PMRU-C03 02/26/18 23:48 [id number]   














  02/26/18





  23:48


 


PMRU Outcome: Genitourinary/ 





Gastrointestinal 


 


Genitourinary- Outcome/Goals Maintain/





 Achieve Urinary





 Continence





 Remain Free of





 Hospital-





 Acquired UTI


 


Gastrointestinal-Outcome/Goals Maintain/





 Achieve Bowel





 Regularity in





 Accordance with





 Pt's Baseline





 Prevent





 Constipation


 


Progression Toward Outcome/Goals -  Not Progressing


 


Progression Toward Outcome/Goals - GI Not Progressing








Medication Administration               Start:  02/22/18 02:06              


Freq:   QSHIFT                          Status: Active      Target:         


Protocol:                                                                   








Activity Type Activity Date Activity User E-Sign Co-Sign Detail





Recorded Client Recorded Date Recorded By   


 


Document 02/26/18 23:48 [id number]   





PMRU-C03 02/26/18 23:48 [id number]   














  02/26/18





  23:48


 


PMRU Outcome: Medication Administration 


 


Assess Patient Knowledge/Teach Med No





Education for all Meds 


 


Outcome/Goals Patient





 Independent





 with Medication





 Administration





 at Home





 Demonstrates





 Understanding


 


Progression Towards Outcome/Goals Progressing


 


Is Patient Going Home on Lovenox? No








Mobility- Improve/Maintain              Start:  02/20/18 18:22              


Freq:   QSHIFT                          Status: Active      Target:         


Protocol:                                                                   








Activity Type Activity Date Activity User E-Sign Co-Sign Detail





Recorded Client Recorded Date Recorded By   


 


Document 02/23/18 17:50 [id number]   





PMRU-C08 02/23/18 17:50 [id number]   














  02/23/18





  17:50


 


PMRU Outcome: Mobility 


 


Physical Therapy Evaluation and Yes





Treatment 


 


Activity OOB with Assistance Yes


 


Device Yes


 


Assistance Yes


 


Patient to be seen 5x/wk for  min/ Therex





day for: Mobility





 Training





 Gait Training





 W/C Mobility





 Balance


 


Outcome/Goals Maintain/





 Achieve





 Baseline





 Mobility Status





 Improve





 Mobility Status





 Demonstrates





 Proper Use of





 Assistive





 Devices





 Free from





 Complications





 of Immobility


 


Progression Toward Outcome/Goals Progressing


 


Bed Mobility Yes:





 independent


 


Transfers Yes:





 independnet





 with joey





 walker


 


Gait x ft Yes:





 independent





 with joey





 walker to





 household





 distances (50')


 


Up/Down Stairs Yes:





 independent





 with 1 rail up/





 down 5








Neurological- Improve/Maintain          Start:  02/22/18 02:06              


Freq:   QSHIFT                          Status: Active      Target:         


Protocol:                                                                   








Activity Type Activity Date Activity User E-Sign Co-Sign Detail





Recorded Client Recorded Date Recorded By   


 


Document 02/26/18 23:48 [id number]   





PMRU-C03 02/26/18 23:48 [id number]   














  02/26/18





  23:48


 


PMRU Outcome: Neurological 


 


Weakness/Aphasia Weakness





 Right Side


 


Outcome/Goals Maintain/





 Achieve





 Baseline





 Neurological





 Status





 Improve





 Neurological





 Status





 Prevent





 Avoidable





 Neurological





 Decline





 Demonstrate





 Knowledge of





 Prevention/Tx





 of Neuro





 Disorders/





 Complication





 Maintain/





 Improve





 Strength/ROM


 


Progression Toward Outcome/Goals Progressing








Nutrition/Swallowing- Improve/Maintain  Start:  02/22/18 02:06              


Freq:   QSHIFT                          Status: Active      Target:         


Protocol:                                                                   








Activity Type Activity Date Activity User E-Sign Co-Sign Detail





Recorded Client Recorded Date Recorded By   


 


Document 02/26/18 23:48 [id number]   





PMRU-C03 02/26/18 23:48 [id number]   














  02/26/18





  23:48


 


PMRU Outcome: Nutrition/Swallowing 


 


Outcome/Goals Demonstrates





 Adequate





 Hydration/





 Prevents





 Dehydration


 


Progression Toward Outcome/Goals Progressing








Safety- Improve/Maintain                Start:  02/22/18 02:06              


Freq:   QSHIFT                          Status: Active      Target:         


Protocol:                                                                   








Activity Type Activity Date Activity User E-Sign Co-Sign Detail





Recorded Client Recorded Date Recorded By   


 


Document 02/26/18 23:48 [id number]   





PMRU-C03 02/26/18 23:48 [id number]   














  02/26/18





  23:48


 


PMRU Outcome: Safety 


 


Outcome/Goals Remain Free of





 Injury or Harm





 Cooperates with





 Safety





 Measures for





 Least





 Restrictive





 Environment





 Prevent Falls/





 Injury


 


Progression Toward Outcome/Goals Progressing


 


Outcome/Goals Met Comment PA in place








Skin- Improve/Maintain                  Start:  02/22/18 02:06              


Freq:   QSHIFT                          Status: Active      Target:         


Protocol:                                                                   








Activity Type Activity Date Activity User E-Sign Co-Sign Detail





Recorded Client Recorded Date Recorded By   


 


Document 02/26/18 23:48 [id number]   





PMRU-C03 02/26/18 23:48 [id number]   














  02/26/18





  23:48


 


PMRU Outcome: Skin 


 


Skin Risk Level High


 


Skin Orders Air Mattress





 Spenco Boots





 Multipodus Boot





 Heels Off Bed





 Turn/Position





 q2hr While in





 Bed


 


Outcome/Goals Maintain/





 Improve Skin





 Intergrity





 Maintain/





 Improve Wound





 Status


 


Progression Toward Outcome/Goals Progressing


 


Outcome/Goals Met Comment multipodus boot





 to R foot,





 spenco to L





 foot














Medicine Note: 








Length of Stay:  5 1/2 weeks





Anticipated Discharge Destination: Home





Tentative Discharge Date:  April 6, 2018





Discharged to:  Home Assistance OOB with selected safe patient handling equipment if applicable/Assistance with ambulation/Communicate fall risk and risk factors to all staff, patient, and family/Monitor gait and stability/Monitor for mental status changes and reorient to person, place, and time, as needed/Provide visual cue: yellow wristband, yellow gown, etc/Reinforce activity limits and safety measures with patient and family/Toileting schedule using arm’s reach rule for commode and bathroom/Use of alarms - bed, stretcher, chair and/or video monitoring/Call bell, personal items and telephone in reach/Instruct patient to call for assistance before getting out of bed/chair/stretcher/Non-slip footwear applied when patient is off stretcher/[place] to call system/Physically safe environment - no spills, clutter or unnecessary equipment/Purposeful Proactive Rounding/Room/bathroom lighting operational, light cord in reach